# Patient Record
Sex: FEMALE | Race: BLACK OR AFRICAN AMERICAN | NOT HISPANIC OR LATINO | Employment: OTHER | URBAN - NONMETROPOLITAN AREA
[De-identification: names, ages, dates, MRNs, and addresses within clinical notes are randomized per-mention and may not be internally consistent; named-entity substitution may affect disease eponyms.]

---

## 2018-03-27 ENCOUNTER — HISTORICAL (OUTPATIENT)
Dept: ADMINISTRATIVE | Facility: HOSPITAL | Age: 83
End: 2018-03-27

## 2018-06-18 ENCOUNTER — HISTORICAL (OUTPATIENT)
Dept: ADMINISTRATIVE | Facility: HOSPITAL | Age: 83
End: 2018-06-18

## 2018-06-25 ENCOUNTER — HISTORICAL (OUTPATIENT)
Dept: ADMINISTRATIVE | Facility: HOSPITAL | Age: 83
End: 2018-06-25

## 2018-09-17 ENCOUNTER — HISTORICAL (OUTPATIENT)
Dept: ADMINISTRATIVE | Facility: HOSPITAL | Age: 83
End: 2018-09-17

## 2018-11-06 ENCOUNTER — HISTORICAL (OUTPATIENT)
Dept: ADMINISTRATIVE | Facility: HOSPITAL | Age: 83
End: 2018-11-06

## 2018-12-17 ENCOUNTER — HISTORICAL (OUTPATIENT)
Dept: ADMINISTRATIVE | Facility: HOSPITAL | Age: 83
End: 2018-12-17

## 2018-12-18 ENCOUNTER — HISTORICAL (OUTPATIENT)
Dept: ADMINISTRATIVE | Facility: HOSPITAL | Age: 83
End: 2018-12-18

## 2018-12-26 ENCOUNTER — HISTORICAL (OUTPATIENT)
Dept: ADMINISTRATIVE | Facility: HOSPITAL | Age: 83
End: 2018-12-26

## 2018-12-28 ENCOUNTER — HISTORICAL (OUTPATIENT)
Dept: ADMINISTRATIVE | Facility: HOSPITAL | Age: 83
End: 2018-12-28

## 2019-01-19 ENCOUNTER — HISTORICAL (OUTPATIENT)
Dept: ADMINISTRATIVE | Facility: HOSPITAL | Age: 84
End: 2019-01-19

## 2019-06-28 ENCOUNTER — HISTORICAL (OUTPATIENT)
Dept: ADMINISTRATIVE | Facility: HOSPITAL | Age: 84
End: 2019-06-28

## 2019-11-18 ENCOUNTER — HISTORICAL (OUTPATIENT)
Dept: ADMINISTRATIVE | Facility: HOSPITAL | Age: 84
End: 2019-11-18

## 2019-12-04 ENCOUNTER — HISTORICAL (OUTPATIENT)
Dept: ADMINISTRATIVE | Facility: HOSPITAL | Age: 84
End: 2019-12-04

## 2020-04-28 ENCOUNTER — HISTORICAL (OUTPATIENT)
Dept: ADMINISTRATIVE | Facility: HOSPITAL | Age: 85
End: 2020-04-28

## 2022-01-19 ENCOUNTER — HISTORICAL (OUTPATIENT)
Dept: ADMINISTRATIVE | Facility: HOSPITAL | Age: 87
End: 2022-01-19

## 2022-02-24 LAB
AGE: 89
ALBUMIN SERPL-MCNC: 4.4 G/DL (ref 3.4–5)
ALBUMIN/GLOB SERPL: 1.6 {RATIO}
ALP SERPL-CCNC: 140 U/L (ref 50–144)
ALT SERPL-CCNC: 17 U/L (ref 1–45)
ANION GAP SERPL CALC-SCNC: 8 MMOL/L (ref 2–13)
AST SERPL-CCNC: 31 U/L (ref 14–36)
BASOPHILS # BLD AUTO: 0.04 10*3/UL (ref 0.01–0.08)
BASOPHILS NFR BLD AUTO: 0.6 % (ref 0.1–1.2)
BILIRUB SERPL-MCNC: 0.69 MG/DL (ref 0–1)
BUN SERPL-MCNC: 13 MG/DL (ref 7–20)
CALCIUM SERPL-MCNC: 9.9 MG/DL (ref 8.4–10.2)
CHLORIDE SERPL-SCNC: 101 MMOL/L (ref 94–110)
CHOLEST SERPL-MCNC: 171 MG/DL (ref 0–200)
CO2 SERPL-SCNC: 27 MMOL/L (ref 21–32)
CREAT SERPL-MCNC: 0.52 MG/DL (ref 0.52–1.04)
CREAT/UREA NIT SERPL: 25 (ref 12–20)
EOSINOPHIL # BLD AUTO: 0.04 10*3/UL (ref 0.04–0.36)
EOSINOPHIL NFR BLD AUTO: 0.6 % (ref 0.7–7)
ERYTHROCYTE [DISTWIDTH] IN BLOOD BY AUTOMATED COUNT: 13.5 % (ref 11–14.5)
EST. AVERAGE GLUCOSE BLD GHB EST-MCNC: 168 MG/DL (ref 70–115)
GLOBULIN SER-MCNC: 2.7 G/DL (ref 2–3.9)
GLUCOSE SERPL-MCNC: 114 MG/DL (ref 70–115)
HBA1C MFR BLD: 7.6 % (ref 4–6)
HCT VFR BLD AUTO: 40.9 % (ref 36–48)
HDLC SERPL-MCNC: 73 MG/DL (ref 40–60)
HGB BLD-MCNC: 14.3 G/DL (ref 11.8–16)
IMM GRANULOCYTES # BLD AUTO: 0.01 10*3/UL (ref 0–0.03)
IMM GRANULOCYTES NFR BLD AUTO: 0.2 % (ref 0–0.5)
LDLC SERPL CALC-MCNC: 69.1 MG/DL (ref 30–100)
LYMPHOCYTES # BLD AUTO: 1.48 10*3/UL (ref 1.16–3.74)
LYMPHOCYTES NFR BLD AUTO: 23.6 % (ref 20–55)
MANUAL DIFF? (OHS): NORMAL
MCH RBC QN AUTO: 31.4 PG (ref 27–34)
MCHC RBC AUTO-ENTMCNC: 35 G/DL (ref 31–37)
MCV RBC AUTO: 89.7 FL (ref 79–99)
MONOCYTES # BLD AUTO: 0.67 10*3/UL (ref 0.24–0.36)
MONOCYTES NFR BLD AUTO: 10.7 % (ref 4.7–12.5)
NEUTROPHILS # BLD AUTO: 4.03 10*3/UL (ref 1.56–6.13)
NEUTROPHILS NFR BLD AUTO: 64.3 % (ref 37–73)
PLATELET # BLD AUTO: 247 10*3/UL (ref 140–371)
PMV BLD AUTO: 9.1 FL (ref 9.4–12.4)
POTASSIUM SERPL-SCNC: 4.3 MMOL/L (ref 3.5–5.1)
PROT SERPL-MCNC: 7.1 G/DL (ref 6.3–8.2)
RBC # BLD AUTO: 4.56 10*6/UL (ref 4–5.1)
SODIUM SERPL-SCNC: 136 MMOL/L (ref 135–145)
TRIGL SERPL-MCNC: 86 MG/DL (ref 30–200)
TSH SERPL-ACNC: 0.84 M[IU]/L (ref 0.36–3.74)
WBC # SPEC AUTO: 6.3 10*3/UL (ref 4–11.5)

## 2022-04-10 ENCOUNTER — HISTORICAL (OUTPATIENT)
Dept: ADMINISTRATIVE | Facility: HOSPITAL | Age: 87
End: 2022-04-10

## 2022-04-28 VITALS
SYSTOLIC BLOOD PRESSURE: 151 MMHG | HEIGHT: 62 IN | DIASTOLIC BLOOD PRESSURE: 67 MMHG | BODY MASS INDEX: 29.63 KG/M2 | WEIGHT: 161 LBS

## 2022-05-15 ENCOUNTER — HISTORICAL (OUTPATIENT)
Dept: ADMINISTRATIVE | Facility: HOSPITAL | Age: 87
End: 2022-05-15

## 2022-05-21 NOTE — HISTORICAL [facility] CERNER
This is a historical note converted from Domo. Formatting and pictures may have been removed.  Please reference Domo for original formatting and attached multimedia. Chief Complaint  Est Care - DM  History of Present Illness  88 y/o BF previously seen by Dr Pickett. ?She has a past medical history of diabetes,?bowel obstruction,?colostomy,?chronic pain?secondary to traumatic injury?of the?hip.  ?  She states she is doing well in general.?She lives alone and?tends to her basic ADLs independently. She is debilitated secondary to trauma, fracture Rt hip and femur several years ago. Ambulates with walker short distances. She denies falls. She has a son with?schizophrenia, lives in [place] and checks on her 2-3 times weekly; he shops for her groceries when he comes to visit. She has a brother nearby that drives her to appointment.  ?  Checks her CBG every morning; states that it is good. She reports?that glucose ranges from 125-200. ?She takes 18 units of Tresiba?every evening?and reports that almost every evening she wakes up?feeling?weak, shaky, and slightly disoriented; she does not check glucose levels during these times.? She keeps crackers and candies by her bedside,?eats that?and recovers within a short period of time and is able to go back to sleep. ?He does not remember?when her last blood work was,?has no idea what her A1c?has been.  ?  She has a colostomy, since 2009, when she had a ruptured bowel. ?She does not have any difficulty with maintaining?the colostomy.  Review of Systems  Constitutional:?no fever, fatigue  Eye:?no vision changes, eye redness, drainage, or pain  ENMT:?no sore throat, ear pain, sinus pain/congestion, nasal congestion/drainage  Respiratory:?no cough, no wheezing, no shortness of breath  Cardiovascular:?no chest pain, no palpitations, no edema  Gastrointestinal:?no nausea, vomiting, diarrhea, or?abdominal pain  Genitourinary:?no dysuria, no frequency or urgency, no hematuria;  +urinary incontinence  Endocrine:?no heat or cold intolerance, no excessive thirst or excessive urination  Musculoskeletal:?no muscle or joint pain, no joint swelling  Integumentary:?no skin rash or abnormal lesion  Neurologic: no headache, no dizziness  ?  Physical Exam  Vitals & Measurements  T:?36.8? ?C (Oral)? HR:?70(Peripheral)? BP:?136/80? SpO2:?96%?  HT:?152.00?cm? WT:?72.000?kg? BMI:?31.16?  General:?AAOx3, in no acute distress  Eye: clear conjunctiva, eyelids normal  HENT:?TMs/ear canals clear, oropharynx without erythema/exudate  Neck: no thyromegaly or lymphadenopathy; no bruits, no JVD  Respiratory:?clear to auscultation bilaterally  Cardiovascular:?regular rate and rhythm with grade 1/6 Systolic?murmurs RSB  Gastrointestinal:?soft, non-tender, with normal bowel sounds?  Genitourinary: no CVA tenderness  Integumentary: no rashes present,?+1 peripheral edema bilaterally  Musculoskeletal: no vertebral tenderness; steady gait  Neurologic: Cranial nerves grossly intact as tested, no sign of peripheral neurological deficit, sensory function intact; decreased strength RLE; NVI  ?  ?  Assessment/Plan  1.?Encounter to establish care ? Z76.89  Labs today (she is fasting); will call with results and make follow up then  2.?Diabetes mellitus ? E11.9  Decreased Tresiba?to 10 units nightly,?patient understands, instructions written for patient and read back by her.?  Continue Ozempic 1 mg?subcut weekly  3.?Colostomy status ? Z93.3  Supplies?ordered today  4.?Medication management ? Z79.899  Explore delivery options and send rx  5.?Hx of deep vein thrombophlebitis of lower extremity ? Z86.72  Remote hx; not on anticoagulants  Patient verbalizes understanding of plan and agrees.? Call w any additional questions, concerns, or issues.? RTC prn or as?scheduled.?   Problem List/Past Medical History  Ongoing  Blood clotting disorder  Closed comminuted intertrochanteric fracture of right femur  Colostomy status  Diabetes  mellitus  Hx of deep vein thrombophlebitis of lower extremity  Medication management  Historical  No qualifying data  Procedure/Surgical History  Transfusion of Nonautologous Red Blood Cells into Peripheral Vein, Percutaneous Approach (08/05/2018)  Intramedullary Aron Insertion Femur (Right) (08/03/2018)  Reposition Right Upper Femur with Intramedullary Internal Fixation Device, Percutaneous Approach (08/03/2018)  bladder suspension  Cholecystectomy;  Colostomy or skin level cecostomy;  Total abdominal hysterectomy (corpus and cervix), with or without removal of tube(s), with or without removal of ovary(s);   Medications  LACTULOSE SOL 10GM/15,? ?Not taking  Ozempic 2 mg/1.5 mL (1 mg dose) subcutaneous solution, 1 mg, Subcutaneous, qWeek  TF 2 3/4 Flng w/ col, See Instructions, 3 refills  Trans 2 3/4 Flng, See Instructions, 3 refills  Tresiba FlexTouch 100 units/mL subcutaneous solution, 10 units, Subcutaneous, Daily  Allergies  Lyrica?(unknown)  Social History  Abuse/Neglect  No, 02/24/2022  Tobacco  Never (less than 100 in lifetime), N/A, 02/24/2022  Family History  Family history is unknown  Immunizations  Vaccine Date Status   COVID-19 mRNA, LNP-S, PF - Moderna 12/08/2021 Recorded   COVID-19 mRNA, LNP-S, PF - Moderna 03/18/2021 Recorded   COVID-19 mRNA, LNP-S, PF - Moderna 02/18/2021 Recorded   influenza virus vaccine, inactivated 11/06/2012 Recorded   Health Maintenance  Health Maintenance  ???Pending?(in the next year)  ??? ??OverDue  ??? ? ? ?Influenza Vaccine due??10/01/21??and every 1??day(s)  ??? ? ? ?Advance Directive due??01/02/22??and every 1??year(s)  ??? ? ? ?Cognitive Screening due??01/02/22??and every 1??year(s)  ??? ? ? ?Functional Assessment due??01/02/22??and every 1??year(s)  ??? ??Due?  ??? ? ? ?ADL Screening due??02/25/22??and every 1??year(s)  ??? ? ? ?Bone Density Screening due??02/25/22??Variable frequency  ??? ? ? ?Diabetes Maintenance-Eye Exam due??02/25/22??Unknown Frequency  ??? ? ?  ?Diabetes Maintenance-Foot Exam due??02/25/22??Unknown Frequency  ??? ? ? ?Diabetes Maintenance-Microalbumin due??02/25/22??Unknown Frequency  ??? ? ? ?Pneumococcal Vaccine due??02/25/22??Unknown Frequency  ??? ? ? ?Tetanus Vaccine due??02/25/22??and every 10??year(s)  ??? ? ? ?Zoster Vaccine due??02/25/22??Unknown Frequency  ??? ??Due In Future?  ??? ? ? ?Medicare Annual Wellness Exam not due until??05/24/22??and every 1??year(s)  ??? ? ? ?Obesity Screening not due until??01/01/23??and every 1??year(s)  ??? ? ? ?Fall Risk Assessment not due until??01/02/23??and every 1??year(s)  ??? ? ? ?Blood Pressure Screening not due until??02/24/23??and every 1??year(s)  ??? ? ? ?Body Mass Index Check not due until??02/24/23??and every 1??year(s)  ??? ? ? ?Depression Screening not due until??02/24/23??and every 1??year(s)  ??? ? ? ?Diabetes Maintenance-HgbA1c not due until??02/24/23??and every 1??year(s)  ??? ? ? ?Diabetes Maintenance-Fasting Lipid Profile not due until??02/24/23??and every 1??year(s)  ??? ? ? ?Diabetes Maintenance-Serum Creatinine not due until??02/24/23??and every 1??year(s)  ???Satisfied?(in the past 1 year)  ??? ??Satisfied?  ??? ? ? ?Blood Pressure Screening on??02/24/22.??Satisfied by Sandra Sadler  ??? ? ? ?Body Mass Index Check on??02/24/22.??Satisfied by Sandra Sadler  ??? ? ? ?Depression Screening on??02/24/22.??Satisfied by Sandra Sadler  ??? ? ? ?Diabetes Maintenance-Fasting Lipid Profile on??02/24/22.??Satisfied by Contributor_system, VICENTES_EVIDENT  ??? ? ? ?Diabetes Maintenance-HgbA1c on??02/24/22.??Satisfied by Contributor_system, VICENTES_EVIDENT  ??? ? ? ?Diabetes Maintenance-Serum Creatinine on??02/24/22.??Satisfied by Contributor_system, JENBARRYS_EVIDENT  ??? ? ? ?Diabetes Screening on??02/24/22.??Satisfied by Contributor_system, JENBARRYS_EVIDENT  ??? ? ? ?Fall Risk Assessment on??02/24/22.??Satisfied by Sandra Sadler  ??? ? ? ?Hypertension Management-BMP  on??02/24/22.??Satisfied by Contributor_system, VICENTES_EVIDENT  ??? ? ? ?Hypertension Management-Blood Pressure on??02/24/22.??Satisfied by Sandra Sadler  ??? ? ? ?Lipid Screening on??02/24/22.??Satisfied by Contributor_system, OLGA_EVIDENT  ??? ? ? ?Obesity Screening on??02/24/22.??Satisfied by Sandra Sadler  ?

## 2022-09-06 ENCOUNTER — HISTORICAL (OUTPATIENT)
Dept: ADMINISTRATIVE | Facility: HOSPITAL | Age: 87
End: 2022-09-06

## 2023-02-08 ENCOUNTER — TELEPHONE (OUTPATIENT)
Dept: FAMILY MEDICINE | Facility: CLINIC | Age: 88
End: 2023-02-08

## 2023-02-08 DIAGNOSIS — E11.9 TYPE 2 DIABETES MELLITUS WITHOUT COMPLICATION, WITHOUT LONG-TERM CURRENT USE OF INSULIN: Primary | ICD-10-CM

## 2023-02-08 RX ORDER — SEMAGLUTIDE 1.34 MG/ML
1 INJECTION, SOLUTION SUBCUTANEOUS
COMMUNITY
Start: 2023-01-13 | End: 2023-02-08 | Stop reason: SDUPTHER

## 2023-02-08 RX ORDER — SEMAGLUTIDE 1.34 MG/ML
1 INJECTION, SOLUTION SUBCUTANEOUS
Qty: 2 PEN | Refills: 6 | Status: SHIPPED | OUTPATIENT
Start: 2023-02-08 | Stop reason: CLARIF

## 2023-02-08 NOTE — TELEPHONE ENCOUNTER
----- Message from Beatriz Lam sent at 2/8/2023 11:11 AM CST -----  Regarding: refill  Ozempic        Pt will take her last shot on tomorrow, so she will be out.            [facility]       [phone] home number    [phone] mobile

## 2023-02-13 RX ORDER — SEMAGLUTIDE 1.34 MG/ML
2 INJECTION, SOLUTION SUBCUTANEOUS
COMMUNITY
Start: 2023-01-19 | Stop reason: CLARIF

## 2023-05-10 ENCOUNTER — TELEPHONE (OUTPATIENT)
Dept: FAMILY MEDICINE | Facility: CLINIC | Age: 88
End: 2023-05-10
Payer: MEDICARE

## 2023-05-10 DIAGNOSIS — E11.9 TYPE 2 DIABETES MELLITUS WITHOUT COMPLICATION, WITHOUT LONG-TERM CURRENT USE OF INSULIN: Primary | ICD-10-CM

## 2023-05-10 NOTE — TELEPHONE ENCOUNTER
[facility] called and said that her copay on Ozempic 1mg is over $100 so they wanted to know if it can be changed to Victoza. I spoke with Kaycee and she said that it can be changed to Victoza 0.6mg daily x7 days then increase to 1.2mg daily there after

## 2023-06-22 VITALS
HEIGHT: 60 IN | DIASTOLIC BLOOD PRESSURE: 82 MMHG | WEIGHT: 143 LBS | SYSTOLIC BLOOD PRESSURE: 154 MMHG | OXYGEN SATURATION: 98 % | HEART RATE: 72 BPM | BODY MASS INDEX: 28.07 KG/M2

## 2023-07-03 ENCOUNTER — TELEPHONE (OUTPATIENT)
Dept: FAMILY MEDICINE | Facility: CLINIC | Age: 88
End: 2023-07-03
Payer: MEDICARE

## 2023-07-03 DIAGNOSIS — E11.9 TYPE 2 DIABETES MELLITUS WITHOUT COMPLICATION, WITHOUT LONG-TERM CURRENT USE OF INSULIN: ICD-10-CM

## 2023-08-08 ENCOUNTER — TELEPHONE (OUTPATIENT)
Dept: FAMILY MEDICINE | Facility: CLINIC | Age: 88
End: 2023-08-08
Payer: MEDICARE

## 2023-08-08 DIAGNOSIS — Z93.3 COLOSTOMY STATUS: Primary | ICD-10-CM

## 2023-08-08 NOTE — TELEPHONE ENCOUNTER
----- Message from Beatriz Lam sent at 8/7/2023  9:51 AM CDT -----  Regarding: call back  Pt is needing a refill on her colostomy bag supplies, needs it to [facility] in [place].    [phone]

## 2023-08-08 NOTE — TELEPHONE ENCOUNTER
----- Message from Beatriz Lam sent at 8/7/2023  9:51 AM CDT -----  Regarding: call back  Pt is needing a refill on her colostomy bag supplies, needs it to [facility] in [place].    [phone]

## 2023-08-10 ENCOUNTER — TELEPHONE (OUTPATIENT)
Dept: FAMILY MEDICINE | Facility: CLINIC | Age: 88
End: 2023-08-10
Payer: MEDICARE

## 2023-08-10 NOTE — TELEPHONE ENCOUNTER
I called [facility] and spoke with Theodora. She said that they received the order but didn't really need one because the orders don't  and that Lanie received her supplies on . I called Karen and explained that to her. She verbalized understanding.

## 2023-08-10 NOTE — TELEPHONE ENCOUNTER
----- Message from Mak Vyas sent at 8/10/2023 10:07 AM CDT -----  Regarding: refill  Pt is needing updated script for colotomy supply's     [facility] in [place]    [phone] Karen Johnson-Daughter

## 2023-08-16 ENCOUNTER — TELEPHONE (OUTPATIENT)
Dept: FAMILY MEDICINE | Facility: CLINIC | Age: 88
End: 2023-08-16
Payer: MEDICARE

## 2023-08-16 DIAGNOSIS — E11.9 TYPE 2 DIABETES MELLITUS WITHOUT COMPLICATION, WITHOUT LONG-TERM CURRENT USE OF INSULIN: ICD-10-CM

## 2023-09-11 ENCOUNTER — TELEPHONE (OUTPATIENT)
Dept: FAMILY MEDICINE | Facility: CLINIC | Age: 88
End: 2023-09-11
Payer: MEDICARE

## 2023-09-11 DIAGNOSIS — Z12.31 ENCOUNTER FOR SCREENING MAMMOGRAM FOR MALIGNANT NEOPLASM OF BREAST: ICD-10-CM

## 2023-09-11 DIAGNOSIS — Z12.39 ENCOUNTER FOR SCREENING FOR MALIGNANT NEOPLASM OF BREAST, UNSPECIFIED SCREENING MODALITY: Primary | ICD-10-CM

## 2023-09-11 PROCEDURE — 80048 BASIC METABOLIC PNL TOTAL CA: CPT | Performed by: NURSE PRACTITIONER

## 2023-09-11 PROCEDURE — 83036 HEMOGLOBIN GLYCOSYLATED A1C: CPT | Performed by: NURSE PRACTITIONER

## 2023-09-19 ENCOUNTER — HOSPITAL ENCOUNTER (OUTPATIENT)
Dept: RADIOLOGY | Facility: HOSPITAL | Age: 88
Discharge: HOME | End: 2023-09-19
Attending: NURSE PRACTITIONER
Payer: MEDICARE

## 2023-09-19 ENCOUNTER — OFFICE VISIT (OUTPATIENT)
Dept: FAMILY MEDICINE | Facility: CLINIC | Age: 88
End: 2023-09-19
Payer: MEDICARE

## 2023-09-19 VITALS
SYSTOLIC BLOOD PRESSURE: 118 MMHG | HEIGHT: 60 IN | BODY MASS INDEX: 30.23 KG/M2 | HEART RATE: 82 BPM | OXYGEN SATURATION: 99 % | WEIGHT: 154 LBS | DIASTOLIC BLOOD PRESSURE: 58 MMHG | TEMPERATURE: 97 F

## 2023-09-19 DIAGNOSIS — H60.312 ACUTE DIFFUSE OTITIS EXTERNA OF LEFT EAR: ICD-10-CM

## 2023-09-19 DIAGNOSIS — Z79.4 TYPE 2 DIABETES MELLITUS WITHOUT COMPLICATION, WITH LONG-TERM CURRENT USE OF INSULIN: Primary | ICD-10-CM

## 2023-09-19 DIAGNOSIS — Z90.11 HISTORY OF MASTECTOMY, RIGHT: ICD-10-CM

## 2023-09-19 DIAGNOSIS — H61.22 IMPACTED CERUMEN OF LEFT EAR: ICD-10-CM

## 2023-09-19 DIAGNOSIS — E11.9 TYPE 2 DIABETES MELLITUS WITHOUT COMPLICATION, WITH LONG-TERM CURRENT USE OF INSULIN: Primary | ICD-10-CM

## 2023-09-19 DIAGNOSIS — Z12.39 ENCOUNTER FOR SCREENING FOR MALIGNANT NEOPLASM OF BREAST, UNSPECIFIED SCREENING MODALITY: ICD-10-CM

## 2023-09-19 DIAGNOSIS — Z12.31 ENCOUNTER FOR SCREENING MAMMOGRAM FOR MALIGNANT NEOPLASM OF BREAST: ICD-10-CM

## 2023-09-19 DIAGNOSIS — Z85.3 HISTORY OF BREAST CANCER: ICD-10-CM

## 2023-09-19 PROBLEM — Z86.718 HISTORY OF DVT (DEEP VEIN THROMBOSIS): Status: ACTIVE | Noted: 2023-09-19

## 2023-09-19 PROBLEM — D68.9 COAGULATION DISORDER: Status: ACTIVE | Noted: 2023-09-19

## 2023-09-19 PROCEDURE — 99214 PR OFFICE/OUTPT VISIT, EST, LEVL IV, 30-39 MIN: ICD-10-PCS | Mod: ,,, | Performed by: NURSE PRACTITIONER

## 2023-09-19 PROCEDURE — 99214 OFFICE O/P EST MOD 30 MIN: CPT | Mod: ,,, | Performed by: NURSE PRACTITIONER

## 2023-09-19 PROCEDURE — 77067 SCR MAMMO BI INCL CAD: CPT | Mod: TC,52

## 2023-09-19 RX ORDER — INSULIN DEGLUDEC 100 U/ML
INJECTION, SOLUTION SUBCUTANEOUS
COMMUNITY
Start: 2023-07-03 | End: 2024-01-23 | Stop reason: ALTCHOICE

## 2023-09-19 RX ORDER — CIPROFLOXACIN AND DEXAMETHASONE 3; 1 MG/ML; MG/ML
4 SUSPENSION/ DROPS AURICULAR (OTIC) 2 TIMES DAILY
Qty: 7.5 ML | Refills: 0 | Status: SHIPPED | OUTPATIENT
Start: 2023-09-19 | End: 2024-01-23 | Stop reason: ALTCHOICE

## 2023-09-19 NOTE — PROGRESS NOTES
"Patient ID: Lanie Abad  : 10/26/1932    Chief Complaint: Lab results (6mth f/u Labs)    Allergies: Patient is allergic to pregabalin.     History of Present Illness:  The patient is a 89 y.o. Black or  female who presents to clinic for follow up on Lab results (6mth f/u Labs)     She is here to follow-up on her diabetes.  She is very good about checking her glucose every morning and also checks it in the evenings.  Has been on 14 units of Tresiba for quite a long period of time.  In the past she is reported some occasional low readings that occurred during the night, she tells me when it gets to 100 she become symptomatic and knows to get up and eat a snack.  Did attempt to add an SGLT2 to her regimen however, she has not been very receptive to changing diabetic medications in the past. She was on Ozempic at one point in time but is now on Victoza; she is please with this medication.  Last A1c was 7.5% and now 8.8%; she tells me "that medicine is not holdin' me no more."   She is very good mentation, seems to have very good knowledge of her medications and how to take them.  A lives alone administers her own medications but does have some that helps her out during the daytime with transportation and basic ADLs.    She continues to follow up with Oncology @ [facility]. Has a mammogram today.     Social History:  reports that she has never smoked. She has never used smokeless tobacco. She reports that she does not drink alcohol and does not use drugs.    Past Medical History:  has a past medical history of Blood clotting disorder, Breast cancer, Closed comminuted intertrochanteric fracture of femur, Colostomy status, Diabetes mellitus, type 2, and History of deep vein thrombophlebitis of lower extremity.    Current Medications:  Current Outpatient Medications   Medication Instructions    ciprofloxacin-dexAMETHasone 0.3-0.1% (CIPRODEX) 0.3-0.1 % DrpS 4 drops, Both Ears, 2 times daily    liraglutide 0.6 " "mg/0.1 mL (18 mg/3 mL) subq PNIJ (VICTOZA 2-RADHA) 1.8 mg, Subcutaneous, Daily    TRESIBA FLEXTOUCH U-100 100 unit/mL (3 mL) insulin pen SMARTSI Unit(s) SUB-Q Every Evening       Review of Systems   See HPI    Visit Vitals  BP (!) 118/58 (BP Location: Left arm)   Pulse 82   Temp 97.2 °F (36.2 °C) (Temporal)   Ht 4' 11.84" (1.52 m)   Wt 69.9 kg (154 lb)   SpO2 99%   BMI 30.24 kg/m²       Physical Exam  Vitals reviewed.   Constitutional:       Appearance: Normal appearance.   Cardiovascular:      Heart sounds: Normal heart sounds.   Pulmonary:      Breath sounds: Normal breath sounds.   Skin:     General: Skin is warm and dry.      Comments: Right mastectomy; incisions healed.    Neurological:      Mental Status: She is oriented to person, place, and time.              Labs Reviewed:  Chemistry:  Lab Results   Component Value Date     (L) 2023    K 4.8 2023    CHLORIDE 97 (L) 2023    BUN 12.0 2023    CREATININE 0.53 (L) 2023    EGFRNORACEVR 88 2023    GLUCOSE 254 (H) 2023    CALCIUM 9.6 2023    ALKPHOS 140 2022    LABPROT 7.1 2022    ALBUMIN 4.4 2022    BILIDIR 0.10 2018    IBILI 0.30 2018    AST 31 2022    ALT 17 2022    MG 1.9 2018    PHOS 1.3 (L) 2018    TSH 0.84 2022        Lab Results   Component Value Date    HGBA1C 8.8 (H) 2023          Assessment & Plan:  1. Type 2 diabetes mellitus without complication, with long-term current use of insulin  Overview:  On Tresiba 14 units daily and Victoza 1.2 mg Qd.    Assessment & Plan:  Increase Victoza to 1.8 mg daily.  Continue Tresiba at 14 units; cannot titrate up as she has occasional nighttime lows.   Refusing additional medications at this time.   F/u 1 month with log.     Orders:  -     liraglutide 0.6 mg/0.1 mL, 18 mg/3 mL, subq PNIJ (VICTOZA 2-RADHA) 0.6 mg/0.1 mL (18 mg/3 mL) PnIj pen; Inject 1.8 mg into the skin once daily.  Dispense: 9 mL; " Refill: 11    2. History of breast cancer  Overview:  2022; status post right mastectomy.      3. History of mastectomy, right  Overview:  2022 following breast cancer dx      4. Impacted cerumen of left ear  -     Ear wax removal    5. Acute diffuse otitis externa of left ear  -     ciprofloxacin-dexAMETHasone 0.3-0.1% (CIPRODEX) 0.3-0.1 % DrpS; Place 4 drops into both ears 2 (two) times daily.  Dispense: 7.5 mL; Refill: 0         Future Appointments   Date Time Provider Department Center   10/17/2023  1:30 PM Kaycee Esparza, [facility]       Follow up in about 4 weeks (around 10/17/2023) for diabetes f/u. Call sooner if needed.    DAVID Staples    Lab Frequency Next Occurrence   Mammo Digital Screening Bilat w/ [facility] Once 09/12/2023

## 2023-09-19 NOTE — ASSESSMENT & PLAN NOTE
Increase Victoza to 1.8 mg daily.  Continue Tresiba at 14 units; cannot titrate up as she has occasional nighttime lows.   Refusing additional medications at this time.   F/u 1 month with log.

## 2023-10-01 ENCOUNTER — HOSPITAL ENCOUNTER (INPATIENT)
Facility: HOSPITAL | Age: 88
LOS: 8 days | Discharge: NURSING, REHAB OR LONG-TERM CARE | DRG: 689 | End: 2023-10-09
Attending: FAMILY MEDICINE | Admitting: INTERNAL MEDICINE
Payer: MEDICARE

## 2023-10-01 DIAGNOSIS — R73.9 HYPERGLYCEMIA: Primary | ICD-10-CM

## 2023-10-01 DIAGNOSIS — R42 DIZZINESS: ICD-10-CM

## 2023-10-01 LAB
ALBUMIN SERPL-MCNC: 3.8 G/DL (ref 3.4–5)
ALBUMIN/GLOB SERPL: 1.4 RATIO
ALP SERPL-CCNC: 211 UNIT/L (ref 50–144)
ALT SERPL-CCNC: 20 UNIT/L (ref 1–45)
ANION GAP SERPL CALC-SCNC: 10 MEQ/L (ref 2–13)
APPEARANCE UR: ABNORMAL
AST SERPL-CCNC: 26 UNIT/L (ref 14–36)
BACTERIA #/AREA URNS AUTO: ABNORMAL /HPF
BASOPHILS # BLD AUTO: 0.04 X10(3)/MCL (ref 0.01–0.08)
BASOPHILS NFR BLD AUTO: 0.3 % (ref 0.1–1.2)
BILIRUB SERPL-MCNC: 1.4 MG/DL (ref 0–1)
BILIRUB UR QL STRIP.AUTO: NEGATIVE
BNP BLD-MCNC: 225 PG/ML (ref 0–124.9)
BUN SERPL-MCNC: 14 MG/DL (ref 7–20)
CALCIUM SERPL-MCNC: 9.1 MG/DL (ref 8.4–10.2)
CHLORIDE SERPL-SCNC: 94 MMOL/L (ref 98–110)
CK MB SERPL-MCNC: 2.21 NG/ML (ref 0–3.38)
CK SERPL-CCNC: 110 U/L (ref 30–135)
CO2 SERPL-SCNC: 22 MMOL/L (ref 21–32)
COLOR UR AUTO: YELLOW
CREAT SERPL-MCNC: 0.61 MG/DL (ref 0.66–1.25)
CREAT/UREA NIT SERPL: 23 (ref 12–20)
EOSINOPHIL # BLD AUTO: 0 X10(3)/MCL (ref 0.04–0.36)
EOSINOPHIL NFR BLD AUTO: 0 % (ref 0.7–7)
ERYTHROCYTE [DISTWIDTH] IN BLOOD BY AUTOMATED COUNT: 13.2 % (ref 11–14.5)
GFR SERPLBLD CREATININE-BSD FMLA CKD-EPI: 85 MLS/MIN/1.73/M2
GLOBULIN SER-MCNC: 2.7 GM/DL (ref 2–3.9)
GLUCOSE SERPL-MCNC: 378 MG/DL (ref 70–115)
GLUCOSE UR QL STRIP.AUTO: NEGATIVE
HCT VFR BLD AUTO: 40.7 % (ref 36–48)
HGB BLD-MCNC: 14.2 G/DL (ref 11.8–16)
IMM GRANULOCYTES # BLD AUTO: 0.06 X10(3)/MCL (ref 0–0.03)
IMM GRANULOCYTES NFR BLD AUTO: 0.4 % (ref 0–0.5)
KETONES UR QL STRIP.AUTO: NEGATIVE
LEUKOCYTE ESTERASE UR QL STRIP.AUTO: ABNORMAL
LYMPHOCYTES # BLD AUTO: 0.37 X10(3)/MCL (ref 1.16–3.74)
LYMPHOCYTES NFR BLD AUTO: 2.7 % (ref 20–55)
MCH RBC QN AUTO: 31.3 PG (ref 27–34)
MCHC RBC AUTO-ENTMCNC: 34.9 G/DL (ref 31–37)
MCV RBC AUTO: 89.8 FL (ref 79–99)
MONOCYTES # BLD AUTO: 1.14 X10(3)/MCL (ref 0.24–0.36)
MONOCYTES NFR BLD AUTO: 8.2 % (ref 4.7–12.5)
NEUTROPHILS # BLD AUTO: 12.23 X10(3)/MCL (ref 1.56–6.13)
NEUTROPHILS NFR BLD AUTO: 88.4 % (ref 37–73)
NITRITE UR QL STRIP.AUTO: POSITIVE
NRBC BLD AUTO-RTO: 0 %
PH UR STRIP.AUTO: 6 [PH]
PLATELET # BLD AUTO: 211 X10(3)/MCL (ref 140–371)
PMV BLD AUTO: 9.2 FL (ref 9.4–12.4)
POCT GLUCOSE: 356 MG/DL (ref 70–110)
POCT GLUCOSE: 386 MG/DL (ref 70–110)
POCT GLUCOSE: >500 MG/DL (ref 70–110)
POTASSIUM SERPL-SCNC: 4.5 MMOL/L (ref 3.5–5.1)
PROT SERPL-MCNC: 6.5 GM/DL (ref 6.3–8.2)
PROT UR QL STRIP.AUTO: ABNORMAL
RBC # BLD AUTO: 4.53 X10(6)/MCL (ref 4–5.1)
RBC #/AREA URNS AUTO: ABNORMAL /HPF
RBC UR QL AUTO: ABNORMAL
SODIUM SERPL-SCNC: 126 MMOL/L (ref 135–145)
SP GR UR STRIP.AUTO: 1.01 (ref 1–1.03)
SQUAMOUS #/AREA URNS AUTO: ABNORMAL /HPF
TROPONIN I SERPL-MCNC: <0.012 NG/ML (ref 0–0.03)
UROBILINOGEN UR STRIP-ACNC: 1
WBC # SPEC AUTO: 13.84 X10(3)/MCL (ref 4–11.5)
WBC #/AREA URNS AUTO: ABNORMAL /HPF

## 2023-10-01 PROCEDURE — 93005 ELECTROCARDIOGRAM TRACING: CPT

## 2023-10-01 PROCEDURE — 81001 URINALYSIS AUTO W/SCOPE: CPT | Performed by: FAMILY MEDICINE

## 2023-10-01 PROCEDURE — 82962 GLUCOSE BLOOD TEST: CPT

## 2023-10-01 PROCEDURE — 83880 ASSAY OF NATRIURETIC PEPTIDE: CPT | Performed by: FAMILY MEDICINE

## 2023-10-01 PROCEDURE — 94761 N-INVAS EAR/PLS OXIMETRY MLT: CPT

## 2023-10-01 PROCEDURE — 25000003 PHARM REV CODE 250: Performed by: FAMILY MEDICINE

## 2023-10-01 PROCEDURE — 99285 EMERGENCY DEPT VISIT HI MDM: CPT | Mod: 25

## 2023-10-01 PROCEDURE — 85025 COMPLETE CBC W/AUTO DIFF WBC: CPT | Performed by: FAMILY MEDICINE

## 2023-10-01 PROCEDURE — 87186 SC STD MICRODIL/AGAR DIL: CPT | Performed by: FAMILY MEDICINE

## 2023-10-01 PROCEDURE — 82550 ASSAY OF CK (CPK): CPT | Performed by: FAMILY MEDICINE

## 2023-10-01 PROCEDURE — 63600175 PHARM REV CODE 636 W HCPCS: Performed by: FAMILY MEDICINE

## 2023-10-01 PROCEDURE — 84484 ASSAY OF TROPONIN QUANT: CPT | Performed by: FAMILY MEDICINE

## 2023-10-01 PROCEDURE — 80053 COMPREHEN METABOLIC PANEL: CPT | Performed by: FAMILY MEDICINE

## 2023-10-01 PROCEDURE — 36415 COLL VENOUS BLD VENIPUNCTURE: CPT | Performed by: FAMILY MEDICINE

## 2023-10-01 PROCEDURE — 11000001 HC ACUTE MED/SURG PRIVATE ROOM

## 2023-10-01 PROCEDURE — 93010 ELECTROCARDIOGRAM REPORT: CPT | Mod: ,,, | Performed by: INTERNAL MEDICINE

## 2023-10-01 PROCEDURE — 93010 EKG 12-LEAD: ICD-10-PCS | Mod: ,,, | Performed by: INTERNAL MEDICINE

## 2023-10-01 PROCEDURE — 87088 URINE BACTERIA CULTURE: CPT | Performed by: FAMILY MEDICINE

## 2023-10-01 PROCEDURE — 25000003 PHARM REV CODE 250: Performed by: INTERNAL MEDICINE

## 2023-10-01 PROCEDURE — 82553 CREATINE MB FRACTION: CPT | Performed by: FAMILY MEDICINE

## 2023-10-01 RX ORDER — ENOXAPARIN SODIUM 100 MG/ML
30 INJECTION SUBCUTANEOUS EVERY 24 HOURS
Status: DISCONTINUED | OUTPATIENT
Start: 2023-10-02 | End: 2023-10-01

## 2023-10-01 RX ORDER — TALC
6 POWDER (GRAM) TOPICAL NIGHTLY PRN
Status: DISCONTINUED | OUTPATIENT
Start: 2023-10-01 | End: 2023-10-09 | Stop reason: HOSPADM

## 2023-10-01 RX ORDER — SODIUM CHLORIDE 0.9 % (FLUSH) 0.9 %
10 SYRINGE (ML) INJECTION
Status: DISCONTINUED | OUTPATIENT
Start: 2023-10-01 | End: 2023-10-09 | Stop reason: HOSPADM

## 2023-10-01 RX ORDER — GLUCAGON 1 MG
1 KIT INJECTION
Status: DISCONTINUED | OUTPATIENT
Start: 2023-10-01 | End: 2023-10-09 | Stop reason: HOSPADM

## 2023-10-01 RX ORDER — IBUPROFEN 200 MG
16 TABLET ORAL
Status: DISCONTINUED | OUTPATIENT
Start: 2023-10-01 | End: 2023-10-09 | Stop reason: HOSPADM

## 2023-10-01 RX ORDER — ENOXAPARIN SODIUM 100 MG/ML
40 INJECTION SUBCUTANEOUS EVERY 24 HOURS
Status: DISCONTINUED | OUTPATIENT
Start: 2023-10-02 | End: 2023-10-09 | Stop reason: HOSPADM

## 2023-10-01 RX ORDER — SODIUM CHLORIDE 9 MG/ML
INJECTION, SOLUTION INTRAVENOUS ONCE
Status: COMPLETED | OUTPATIENT
Start: 2023-10-01 | End: 2023-10-02

## 2023-10-01 RX ORDER — IBUPROFEN 200 MG
24 TABLET ORAL
Status: DISCONTINUED | OUTPATIENT
Start: 2023-10-01 | End: 2023-10-09 | Stop reason: HOSPADM

## 2023-10-01 RX ADMIN — CIPROFLOXACIN HYDROCHLORIDE AND HYDROCORTISONE 3 DROP: 2; 10 SUSPENSION/ DROPS AURICULAR (OTIC) at 10:10

## 2023-10-01 RX ADMIN — CEFTRIAXONE SODIUM 1 G: 1 INJECTION, POWDER, FOR SOLUTION INTRAMUSCULAR; INTRAVENOUS at 05:10

## 2023-10-01 NOTE — H&P
[facility] Medicine  History & Physical    Patient Name: Lanie Abad  MRN: [id number]  Patient Class: IP- Inpatient  Admission Date: 10/1/2023  Attending Physician:Benja Mi MD  Primary Care Provider: Kaycee Esparza FNP-C         Patient information was obtained from via telemed    Subjective:     Principal Problem:<principal problem not specified>    Chief Complaint:   Chief Complaint   Patient presents with    Weakness    Dizziness     Pt states she felt dizzy this morning        HPI: 89 yo female with hx IDDM, Colostomy, Rt mastectomy due to breast CA, Recent Left er infection lives alone gets around with wheelchair presents with a fall in the bathroom couldn't get up. She had another fall 4 days ago after her bathroom chair chair broke. Denies Chest pain, SOB, productive cough, N/V/D, has dysuria, and frequency. Accompanied by her children daughter and son      Past Medical History:   Diagnosis Date    Blood clotting disorder     Breast cancer     Closed comminuted intertrochanteric fracture of femur     Colostomy status     Diabetes mellitus, type 2     History of deep vein thrombophlebitis of lower extremity        Past Surgical History:   Procedure Laterality Date    BLADDER SUSPENSION      CHOLECYSTECTOMY      HYSTERECTOMY      INSERTION OF INTRAMEDULLARY ROSA ELENA Right 08/03/2018    femur    MASTECTOMY Right        Review of patient's allergies indicates:   Allergen Reactions    Pregabalin      Other reaction(s): unknown       No current facility-administered medications on file prior to encounter.     Current Outpatient Medications on File Prior to Encounter   Medication Sig    ciprofloxacin-dexAMETHasone 0.3-0.1% (CIPRODEX) 0.3-0.1 % DrpS Place 4 drops into both ears 2 (two) times daily.    liraglutide 0.6 mg/0.1 mL, 18 mg/3 mL, subq PNIJ (VICTOZA 2-RADHA) 0.6 mg/0.1 mL (18 mg/3 mL) PnIj pen Inject 1.8 mg into the skin once daily.    TRESIBA  FLEXTOUCH U-100 100 unit/mL (3 mL) insulin pen SMARTSI Unit(s) SUB-Q Every Evening    [DISCONTINUED] OZEMPIC 0.25 mg or 0.5 mg(2 mg/1.5 mL) pen injector Inject 2 mg into the skin every 7 days.    [DISCONTINUED] OZEMPIC 1 mg/dose (4 mg/3 mL) Inject 1 mg into the skin every 7 days.    [DISCONTINUED] semaglutide (OZEMPIC) 0.25 mg or 0.5 mg(2 mg/1.5 mL) pen injector INJECT 1MG (2 SHOTS OF 0.5MG) SUBQ WEEKLY AS DIRECTED - ROTATE INJECTION SITES    [DISCONTINUED] semaglutide (OZEMPIC) 1 mg/dose (4 mg/3 mL) Inject 1 mg into the skin.     Family History    None       Tobacco Use    Smoking status: Never    Smokeless tobacco: Never   Substance and Sexual Activity    Alcohol use: Never    Drug use: Never    Sexual activity: Not Currently     Review of Systems   Constitutional: Negative.    HENT:  Positive for ear pain.    Eyes: Negative.    Respiratory: Negative.     Cardiovascular: Negative.    Gastrointestinal: Negative.    Endocrine: Negative.    Genitourinary:  Positive for dysuria and frequency.   Musculoskeletal: Negative.    Skin: Negative.    Allergic/Immunologic: Negative.    Neurological:  Positive for weakness.   Hematological: Negative.    Psychiatric/Behavioral: Negative.       Objective:     Vital Signs (Most Recent):  Temp: 99.5 °F (37.5 °C) (10/01/23 1422)  Pulse: 104 (10/01/23 1643)  Resp: 20 (10/01/23 1643)  BP: 115/60 (10/01/23 1643)  SpO2: 96 % (10/01/23 1643) Vital Signs (24h Range):  Temp:  [99.5 °F (37.5 °C)] 99.5 °F (37.5 °C)  Pulse:  [] 104  Resp:  [2-20] 20  SpO2:  [93 %-99 %] 96 %  BP: (103-118)/(55-74) 115/60     Weight: 71.7 kg (158 lb)  Body mass index is 28.9 kg/m².     Physical Exam  Constitutional:       Appearance: Normal appearance. She is obese.   HENT:      Head: Normocephalic and atraumatic.      Mouth/Throat:      Pharynx: Oropharynx is clear.   Eyes:      Extraocular Movements: Extraocular movements intact.      Conjunctiva/sclera: Conjunctivae normal.      Pupils:  "Pupils are equal, round, and reactive to light.   Cardiovascular:      Rate and Rhythm: Regular rhythm. Tachycardia present.   Pulmonary:      Effort: Pulmonary effort is normal.      Breath sounds: Normal breath sounds.   Abdominal:      General: Abdomen is flat. Bowel sounds are normal.      Palpations: Abdomen is soft.      Comments: Colostomy in place   Musculoskeletal:         General: Normal range of motion.      Cervical back: Normal range of motion and neck supple.   Skin:     General: Skin is warm and dry.   Neurological:      General: No focal deficit present.      Mental Status: She is alert and oriented to person, place, and time.   Psychiatric:         Mood and Affect: Mood normal.              CRANIAL NERVES     CN III, IV, VI   Pupils are equal, round, and reactive to light.       Significant Labs: All pertinent labs within the past 24 hours have been reviewed.  A1C:   Recent Labs   Lab 09/11/23  1006   HGBA1C 8.8*     ABGs: No results for input(s): "PH", "PCO2", "HCO3", "POCSATURATED", "BE", "TOTALHB", "COHB", "METHB", "O2HB", "POCFIO2", "PO2" in the last 48 hours.  Bilirubin:   Recent Labs   Lab 10/01/23  1545   BILITOT 1.4*     Blood Culture: No results for input(s): "LABBLOO" in the last 48 hours.  BMP:   Recent Labs   Lab 10/01/23  1545   *   K 4.5   CO2 22   BUN 14.0   CREATININE 0.61*   CALCIUM 9.1     CBC:   Recent Labs   Lab 10/01/23  1545   WBC 13.84*   HGB 14.2   HCT 40.7        CMP:   Recent Labs   Lab 10/01/23  1545   *   K 4.5   CO2 22   BUN 14.0   CREATININE 0.61*   CALCIUM 9.1   ALBUMIN 3.8   BILITOT 1.4*   ALKPHOS 211*   AST 26   ALT 20     Cardiac Markers: No results for input(s): "CKMB", "MYOGLOBIN", "BNP", "TROPISTAT" in the last 48 hours.  Coagulation: No results for input(s): "PT", "INR", "APTT" in the last 48 hours.  Lactic Acid: No results for input(s): "LACTATE" in the last 48 hours.  Lipase: No results for input(s): "LIPASE" in the last 48 hours.  Lipid " "Panel: No results for input(s): "CHOL", "HDL", "LDLCALC", "TRIG", "CHOLHDL" in the last 48 hours.  Magnesium: No results for input(s): "MG" in the last 48 hours.  Pathology Results  (Last 10 years)      None          POCT Glucose:   Recent Labs   Lab 10/01/23  1440   POCTGLUCOSE >500*     Prealbumin: No results for input(s): "PREALBUMIN" in the last 48 hours.  Respiratory Culture: No results for input(s): "GSRESP", "RESPIRATORYC" in the last 48 hours.  Troponin:   Recent Labs   Lab 10/01/23  1545   TROPONINI <0.012     TSH: No results for input(s): "TSH" in the last 4320 hours.  Urine Culture: No results for input(s): "LABURIN" in the last 48 hours.  Urine Studies:   Recent Labs   Lab 10/01/23  1643   APPEARANCEUA Cloudy*   PROTEINUA Trace*   BILIRUBINUA Negative   UROBILINOGEN 1.0   LEUKOCYTESUR Small*   RBCUA 3-5   WBCUA 11-20*   BACTERIA 4+*       Significant Imaging:    CXR  Left sided atelectasis    Assessment/Plan:     No notes have been filed under this hospital service.  Service: Hospital Medicine    VTE Risk Mitigation (From admission, onward)         Ordered     IP VTE HIGH RISK PATIENT  Once         10/01/23 1712     Place sequential compression device  Until discontinued         10/01/23 1712               - Clinical Sepsis due to UTI check Ucx, Bcx, started on Rocephin. Follow up with Ucx  -Weakness likely due to IVVD, Hydrate. PT/OT evaluate  - IDDM Hgb AC 8.8, check Accu checks, and cover with SSI  -Left otitis extrna continue cipro drops  - hx Breast CA  -S/p Colostomy  -DVT ppx on Lovenox  Pt is Full code, SDM is her Daughter Laila Mckay  Pt is seen and examined via telemed. Pt is in [facility], I am in [place], [place]. Nursing staff assisted with evaluation of the patient  Pt is being admitted as an inpatient to the hospitalist for further evaluation andtretament                Benja Mi MD  Department of Hospital Medicine  [facility]-Emergency Dept  "

## 2023-10-01 NOTE — ED PROVIDER NOTES
Encounter Date: 10/1/2023    SCRIBE #1 NOTE: I, am scribing for, and in the presence of,  . I have scribed the entire note.       History     Chief Complaint   Patient presents with    Weakness    Dizziness     Pt states she felt dizzy this morning     Patient says that she felt dizzy in the morning and she had a fall she had a fall few days ago in the bathtub where higher tub transfer bench broke the patient knows and said that it happened because the bench broke and she was given a new 1 she denies any head trauma since the patient knows the exact details tells me that the brain is fine patient says that she had colostomy and she has a colostomy bag and that was in 2009 the patient has had mastectomy the patient says that it was less than 1 year ago in the brain is working fine she was able to give a pretty detailed account of her problems she says that her blood sugar medicine has been changed to a cheaper kind which is not controlling the blood sugar and her blood sugar was greater than 500 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 the patient is also complaining of dizziness I am going to check some cardiac workup        Review of patient's allergies indicates:   Allergen Reactions    Pregabalin      Other reaction(s): unknown     Past Medical History:   Diagnosis Date    Blood clotting disorder     Breast cancer     Closed comminuted intertrochanteric fracture of femur     Colostomy status     Diabetes mellitus, type 2     History of deep vein thrombophlebitis of lower extremity      Past Surgical History:   Procedure Laterality Date    BLADDER SUSPENSION      CHOLECYSTECTOMY      HYSTERECTOMY      INSERTION OF INTRAMEDULLARY ROSA ELENA Right 08/03/2018    femur    MASTECTOMY Right      History reviewed. No pertinent family history.  Social History     Tobacco Use    Smoking status: Never    Smokeless tobacco: Never   Substance Use Topics    Alcohol use: Never    Drug use: Never     Review of  Systems   Constitutional:  Negative for fever.   HENT:  Negative for congestion and sore throat.    Eyes:  Negative for pain, discharge and itching.   Respiratory:  Negative for shortness of breath.    Cardiovascular:  Negative for chest pain.   Gastrointestinal:  Negative for nausea.   Endocrine: Negative for cold intolerance, heat intolerance, polydipsia and polyphagia.   Genitourinary:  Negative for dysuria.   Musculoskeletal:  Negative for back pain.   Skin:  Negative for color change, pallor and rash.   Neurological:  Positive for dizziness and light-headedness. Negative for weakness and headaches.   Hematological:  Does not bruise/bleed easily.       Physical Exam     Initial Vitals [10/01/23 1422]   BP Pulse Resp Temp SpO2   118/74 103 20 99.5 °F (37.5 °C) 97 %      MAP       --         Physical Exam    Nursing note and vitals reviewed.  Constitutional: She appears well-developed.   Eyes: Pupils are equal, round, and reactive to light.   Neck:   Normal range of motion.  Cardiovascular:  Normal rate, regular rhythm, normal heart sounds and intact distal pulses.           Pulmonary/Chest: Breath sounds normal.   Abdominal: Abdomen is soft. Bowel sounds are normal.   Musculoskeletal:         General: Normal range of motion.      Cervical back: Normal range of motion.     Skin: Skin is warm.   Psychiatric: She has a normal mood and affect.         ED Course   Procedures  Labs Reviewed   COMPREHENSIVE METABOLIC PANEL - Abnormal; Notable for the following components:       Result Value    Sodium Level 126 (*)     Chloride 94 (*)     Glucose Level 378 (*)     Creatinine 0.61 (*)     Bilirubin Total 1.4 (*)     Alkaline Phosphatase 211 (*)     BUN/Creatinine Ratio 23 (*)     All other components within normal limits   NT-PRO NATRIURETIC PEPTIDE - Abnormal; Notable for the following components:    ProBNP 225.0 (*)     All other components within normal limits   CBC WITH DIFFERENTIAL - Abnormal; Notable for the  following components:    WBC 13.84 (*)     MPV 9.2 (*)     Neut % 88.4 (*)     Lymph % 2.7 (*)     Eos % 0.0 (*)     Lymph # 0.37 (*)     Neut # 12.23 (*)     Mono # 1.14 (*)     Eos # 0.00 (*)     IG# 0.06 (*)     All other components within normal limits   URINALYSIS, REFLEX TO URINE CULTURE - Abnormal; Notable for the following components:    Appearance, UA Cloudy (*)     Protein, UA Trace (*)     Blood, UA Moderate (*)     Nitrites, UA Positive (*)     Leukocyte Esterase, UA Small (*)     All other components within normal limits    Narrative:      URINE STABILITY IS 2 HOURS AT ROOM TEMP OR    SIX HOURS REFRIGERATED. PERFORMING TESTING ON    SPECIMENS GREATER THAN THIS AGE MAY AFFECT THE    FOLLOWING TESTS:    PH          SPECIFIC GRAVITY           BLOOD    CLARITY     BILIRUBIN               UROBILINOGEN   URINALYSIS, MICROSCOPIC - Abnormal; Notable for the following components:    Bacteria, UA 4+ (*)     WBC, UA 11-20 (*)     All other components within normal limits   POCT GLUCOSE - Abnormal; Notable for the following components:    POCT Glucose >500 (*)     All other components within normal limits   POCT GLUCOSE - Abnormal; Notable for the following components:    POCT Glucose 356 (*)     All other components within normal limits   CK-MB - Normal   CK - Normal   TROPONIN I - Normal   CULTURE, URINE   CBC W/ AUTO DIFFERENTIAL    Narrative:     The following orders were created for panel order CBC Auto Differential.  Procedure                               Abnormality         Status                     ---------                               -----------         ------                     CBC with Differential[[id number]]       Abnormal            Final result                 Please view results for these tests on the individual orders.   POCT GLUCOSE MONITORING CONTINUOUS          Imaging Results              X-Ray Chest AP Portable (Final result)  Result time 10/01/23 16:30:54      Final result by Jermaine  Jose SAINZ MD (10/01/23 16:30:54)                   Impression:      1. There are hazy and reticular opacities in left lung base which may reflect atelectasis or early infiltrate.      Electronically signed by: Jose Dean MD  Date:    10/01/2023  Time:    16:30               Narrative:    EXAMINATION:  XR CHEST AP PORTABLE    CLINICAL HISTORY:  Dizziness and giddiness.    COMPARISON:  Chest x-ray 12/26/2018    FINDINGS:  Frontal view of the chest was obtained.  The cardiac silhouette is within normal limits for size. The aorta is mildly tortuous and partially calcified.  There are hazy and reticular opacities in left lung base which may reflect atelectasis or early infiltrate.  No evidence of pneumothorax or pleural effusion is seen.  No acute displaced fracture or dislocation is present.                                       Medications   sodium chloride 0.9% flush 10 mL (has no administration in time range)   melatonin tablet 6 mg (has no administration in time range)   cefTRIAXone (ROCEPHIN) 1 g in dextrose 5 % in water (D5W) 100 mL IVPB (MB+) (1 g Intravenous New Bag 10/1/23 1734)   glucose chewable tablet 16 g (has no administration in time range)   glucose chewable tablet 24 g (has no administration in time range)   glucagon (human recombinant) injection 1 mg (has no administration in time range)   dextrose 10% bolus 125 mL 125 mL (has no administration in time range)   dextrose 10% bolus 250 mL 250 mL (has no administration in time range)   ciprofloxacin-hydrocortisone 0.2-1% otic suspension 3 drop (has no administration in time range)   insulin detemir U-100 (Levemir) pen 14 Units (has no administration in time range)   0.9%  NaCl infusion (has no administration in time range)   enoxaparin injection 40 mg (has no administration in time range)     Medical Decision Making  Amount and/or Complexity of Data Reviewed  Labs: ordered.  Radiology: ordered.    Risk  OTC drugs.  Prescription drug management.  Decision  regarding hospitalization.                               Clinical Impression:   Final diagnoses:  [R42] Dizziness  [R73.9] Hyperglycemia (Primary)        ED Disposition Condition    Admit Stable                Leo Car MD  10/01/23 0970

## 2023-10-01 NOTE — HPI
89 yo female with hx IDDM, Colostomy, Rt mastectomy due to breast CA, Recent Left er infection lives alone gets around with wheelchair presents with a fall in the bathroom couldn't get up. She had another fall 4 days ago after her bathroom chair chair broke. Denies Chest pain, SOB, productive cough, N/V/D, has dysuria, and frequency. Accompanied by her children daughter and son

## 2023-10-01 NOTE — SUBJECTIVE & OBJECTIVE
Past Medical History:   Diagnosis Date    Blood clotting disorder     Breast cancer     Closed comminuted intertrochanteric fracture of femur     Colostomy status     Diabetes mellitus, type 2     History of deep vein thrombophlebitis of lower extremity        Past Surgical History:   Procedure Laterality Date    BLADDER SUSPENSION      CHOLECYSTECTOMY      HYSTERECTOMY      INSERTION OF INTRAMEDULLARY ROSA ELENA Right 2018    femur    MASTECTOMY Right        Review of patient's allergies indicates:   Allergen Reactions    Pregabalin      Other reaction(s): unknown       No current facility-administered medications on file prior to encounter.     Current Outpatient Medications on File Prior to Encounter   Medication Sig    ciprofloxacin-dexAMETHasone 0.3-0.1% (CIPRODEX) 0.3-0.1 % DrpS Place 4 drops into both ears 2 (two) times daily.    liraglutide 0.6 mg/0.1 mL, 18 mg/3 mL, subq PNIJ (VICTOZA 2-RADHA) 0.6 mg/0.1 mL (18 mg/3 mL) PnIj pen Inject 1.8 mg into the skin once daily.    TRESIBA FLEXTOUCH U-100 100 unit/mL (3 mL) insulin pen SMARTSI Unit(s) SUB-Q Every Evening    [DISCONTINUED] OZEMPIC 0.25 mg or 0.5 mg(2 mg/1.5 mL) pen injector Inject 2 mg into the skin every 7 days.    [DISCONTINUED] OZEMPIC 1 mg/dose (4 mg/3 mL) Inject 1 mg into the skin every 7 days.    [DISCONTINUED] semaglutide (OZEMPIC) 0.25 mg or 0.5 mg(2 mg/1.5 mL) pen injector INJECT 1MG (2 SHOTS OF 0.5MG) SUBQ WEEKLY AS DIRECTED - ROTATE INJECTION SITES    [DISCONTINUED] semaglutide (OZEMPIC) 1 mg/dose (4 mg/3 mL) Inject 1 mg into the skin.     Family History    None       Tobacco Use    Smoking status: Never    Smokeless tobacco: Never   Substance and Sexual Activity    Alcohol use: Never    Drug use: Never    Sexual activity: Not Currently     Review of Systems   Constitutional: Negative.    HENT:  Positive for ear pain.    Eyes: Negative.    Respiratory: Negative.     Cardiovascular: Negative.    Gastrointestinal: Negative.    Endocrine:  Negative.    Genitourinary:  Positive for dysuria and frequency.   Musculoskeletal: Negative.    Skin: Negative.    Allergic/Immunologic: Negative.    Neurological:  Positive for weakness.   Hematological: Negative.    Psychiatric/Behavioral: Negative.       Objective:     Vital Signs (Most Recent):  Temp: 99.5 °F (37.5 °C) (10/01/23 1422)  Pulse: 104 (10/01/23 1643)  Resp: 20 (10/01/23 1643)  BP: 115/60 (10/01/23 1643)  SpO2: 96 % (10/01/23 1643) Vital Signs (24h Range):  Temp:  [99.5 °F (37.5 °C)] 99.5 °F (37.5 °C)  Pulse:  [] 104  Resp:  [2-20] 20  SpO2:  [93 %-99 %] 96 %  BP: (103-118)/(55-74) 115/60     Weight: 71.7 kg (158 lb)  Body mass index is 28.9 kg/m².     Physical Exam  Constitutional:       Appearance: Normal appearance. She is obese.   HENT:      Head: Normocephalic and atraumatic.      Mouth/Throat:      Pharynx: Oropharynx is clear.   Eyes:      Extraocular Movements: Extraocular movements intact.      Conjunctiva/sclera: Conjunctivae normal.      Pupils: Pupils are equal, round, and reactive to light.   Cardiovascular:      Rate and Rhythm: Regular rhythm. Tachycardia present.   Pulmonary:      Effort: Pulmonary effort is normal.      Breath sounds: Normal breath sounds.   Abdominal:      General: Abdomen is flat. Bowel sounds are normal.      Palpations: Abdomen is soft.      Comments: Colostomy in place   Musculoskeletal:         General: Normal range of motion.      Cervical back: Normal range of motion and neck supple.   Skin:     General: Skin is warm and dry.   Neurological:      General: No focal deficit present.      Mental Status: She is alert and oriented to person, place, and time.   Psychiatric:         Mood and Affect: Mood normal.              CRANIAL NERVES     CN III, IV, VI   Pupils are equal, round, and reactive to light.       Significant Labs: All pertinent labs within the past 24 hours have been reviewed.  A1C:   Recent Labs   Lab 09/11/23  1006   HGBA1C 8.8*     ABGs: No  "results for input(s): "PH", "PCO2", "HCO3", "POCSATURATED", "BE", "TOTALHB", "COHB", "METHB", "O2HB", "POCFIO2", "PO2" in the last 48 hours.  Bilirubin:   Recent Labs   Lab 10/01/23  1545   BILITOT 1.4*     Blood Culture: No results for input(s): "LABBLOO" in the last 48 hours.  BMP:   Recent Labs   Lab 10/01/23  1545   *   K 4.5   CO2 22   BUN 14.0   CREATININE 0.61*   CALCIUM 9.1     CBC:   Recent Labs   Lab 10/01/23  1545   WBC 13.84*   HGB 14.2   HCT 40.7        CMP:   Recent Labs   Lab 10/01/23  1545   *   K 4.5   CO2 22   BUN 14.0   CREATININE 0.61*   CALCIUM 9.1   ALBUMIN 3.8   BILITOT 1.4*   ALKPHOS 211*   AST 26   ALT 20     Cardiac Markers: No results for input(s): "CKMB", "MYOGLOBIN", "BNP", "TROPISTAT" in the last 48 hours.  Coagulation: No results for input(s): "PT", "INR", "APTT" in the last 48 hours.  Lactic Acid: No results for input(s): "LACTATE" in the last 48 hours.  Lipase: No results for input(s): "LIPASE" in the last 48 hours.  Lipid Panel: No results for input(s): "CHOL", "HDL", "LDLCALC", "TRIG", "CHOLHDL" in the last 48 hours.  Magnesium: No results for input(s): "MG" in the last 48 hours.  Pathology Results  (Last 10 years)      None          POCT Glucose:   Recent Labs   Lab 10/01/23  1440   POCTGLUCOSE >500*     Prealbumin: No results for input(s): "PREALBUMIN" in the last 48 hours.  Respiratory Culture: No results for input(s): "GSRESP", "RESPIRATORYC" in the last 48 hours.  Troponin:   Recent Labs   Lab 10/01/23  1545   TROPONINI <0.012     TSH: No results for input(s): "TSH" in the last 4320 hours.  Urine Culture: No results for input(s): "LABURIN" in the last 48 hours.  Urine Studies:   Recent Labs   Lab 10/01/23  1643   APPEARANCEUA Cloudy*   PROTEINUA Trace*   BILIRUBINUA Negative   UROBILINOGEN 1.0   LEUKOCYTESUR Small*   RBCUA 3-5   WBCUA 11-20*   BACTERIA 4+*       Significant Imaging:    CXR  Left sided atelectasis  "

## 2023-10-02 PROBLEM — A41.9 SEPSIS: Status: ACTIVE | Noted: 2023-10-02

## 2023-10-02 PROBLEM — N39.0 UTI (URINARY TRACT INFECTION): Status: ACTIVE | Noted: 2023-10-02

## 2023-10-02 PROBLEM — R29.6 FALLS FREQUENTLY: Status: ACTIVE | Noted: 2023-10-02

## 2023-10-02 PROBLEM — L89.152 DECUBITUS ULCER OF COCCYX, STAGE 2: Status: ACTIVE | Noted: 2023-10-02

## 2023-10-02 PROBLEM — Z93.3 COLOSTOMY IN PLACE: Status: ACTIVE | Noted: 2023-10-02

## 2023-10-02 PROBLEM — E11.65 UNCONTROLLED DIABETES MELLITUS WITH HYPERGLYCEMIA, WITH LONG-TERM CURRENT USE OF INSULIN: Status: ACTIVE | Noted: 2023-09-19

## 2023-10-02 PROBLEM — E11.49 CONTROLLED TYPE 2 DIABETES MELLITUS WITH NEUROLOGIC COMPLICATION, WITH LONG-TERM CURRENT USE OF INSULIN: Status: ACTIVE | Noted: 2023-09-19

## 2023-10-02 LAB
ALBUMIN SERPL-MCNC: 3.3 G/DL (ref 3.4–5)
ALBUMIN/GLOB SERPL: 1.3 RATIO
ALP SERPL-CCNC: 177 UNIT/L (ref 50–144)
ALT SERPL-CCNC: 17 UNIT/L (ref 1–45)
ANION GAP SERPL CALC-SCNC: 11 MEQ/L (ref 2–13)
AST SERPL-CCNC: 28 UNIT/L (ref 14–36)
BASOPHILS # BLD AUTO: 0.02 X10(3)/MCL (ref 0.01–0.08)
BASOPHILS NFR BLD AUTO: 0.2 % (ref 0.1–1.2)
BILIRUB SERPL-MCNC: 1.1 MG/DL (ref 0–1)
BUN SERPL-MCNC: 14 MG/DL (ref 7–20)
CALCIUM SERPL-MCNC: 8.2 MG/DL (ref 8.4–10.2)
CHLORIDE SERPL-SCNC: 96 MMOL/L (ref 98–110)
CO2 SERPL-SCNC: 16 MMOL/L (ref 21–32)
CREAT SERPL-MCNC: 0.51 MG/DL (ref 0.66–1.25)
CREAT/UREA NIT SERPL: 27 (ref 12–20)
EOSINOPHIL # BLD AUTO: 0 X10(3)/MCL (ref 0.04–0.36)
EOSINOPHIL NFR BLD AUTO: 0 % (ref 0.7–7)
ERYTHROCYTE [DISTWIDTH] IN BLOOD BY AUTOMATED COUNT: 13.4 % (ref 11–14.5)
GFR SERPLBLD CREATININE-BSD FMLA CKD-EPI: 89 MLS/MIN/1.73/M2
GLOBULIN SER-MCNC: 2.6 GM/DL (ref 2–3.9)
GLUCOSE SERPL-MCNC: 284 MG/DL (ref 70–115)
HCT VFR BLD AUTO: 38.6 % (ref 36–48)
HGB BLD-MCNC: 13.5 G/DL (ref 11.8–16)
IMM GRANULOCYTES # BLD AUTO: 0.07 X10(3)/MCL (ref 0–0.03)
IMM GRANULOCYTES NFR BLD AUTO: 0.6 % (ref 0–0.5)
LYMPHOCYTES # BLD AUTO: 0.79 X10(3)/MCL (ref 1.16–3.74)
LYMPHOCYTES NFR BLD AUTO: 6.6 % (ref 20–55)
MCH RBC QN AUTO: 31.5 PG (ref 27–34)
MCHC RBC AUTO-ENTMCNC: 35 G/DL (ref 31–37)
MCV RBC AUTO: 90.2 FL (ref 79–99)
MONOCYTES # BLD AUTO: 1.42 X10(3)/MCL (ref 0.24–0.36)
MONOCYTES NFR BLD AUTO: 11.8 % (ref 4.7–12.5)
NEUTROPHILS # BLD AUTO: 9.69 X10(3)/MCL (ref 1.56–6.13)
NEUTROPHILS NFR BLD AUTO: 80.8 % (ref 37–73)
NRBC BLD AUTO-RTO: 0 %
PLATELET # BLD AUTO: 193 X10(3)/MCL (ref 140–371)
PMV BLD AUTO: 9 FL (ref 9.4–12.4)
POCT GLUCOSE: 282 MG/DL (ref 70–110)
POCT GLUCOSE: 284 MG/DL (ref 70–110)
POCT GLUCOSE: 290 MG/DL (ref 70–110)
POCT GLUCOSE: 357 MG/DL (ref 70–110)
POCT GLUCOSE: 373 MG/DL (ref 70–110)
POTASSIUM SERPL-SCNC: 4.3 MMOL/L (ref 3.5–5.1)
PROT SERPL-MCNC: 5.9 GM/DL (ref 6.3–8.2)
RBC # BLD AUTO: 4.28 X10(6)/MCL (ref 4–5.1)
SODIUM SERPL-SCNC: 123 MMOL/L (ref 135–145)
WBC # SPEC AUTO: 11.99 X10(3)/MCL (ref 4–11.5)

## 2023-10-02 PROCEDURE — 80053 COMPREHEN METABOLIC PANEL: CPT | Performed by: FAMILY MEDICINE

## 2023-10-02 PROCEDURE — 25000003 PHARM REV CODE 250: Performed by: FAMILY MEDICINE

## 2023-10-02 PROCEDURE — 63600175 PHARM REV CODE 636 W HCPCS: Performed by: INTERNAL MEDICINE

## 2023-10-02 PROCEDURE — 63600175 PHARM REV CODE 636 W HCPCS: Performed by: FAMILY MEDICINE

## 2023-10-02 PROCEDURE — 94761 N-INVAS EAR/PLS OXIMETRY MLT: CPT

## 2023-10-02 PROCEDURE — 97110 THERAPEUTIC EXERCISES: CPT

## 2023-10-02 PROCEDURE — 36415 COLL VENOUS BLD VENIPUNCTURE: CPT | Performed by: FAMILY MEDICINE

## 2023-10-02 PROCEDURE — 25000003 PHARM REV CODE 250: Performed by: INTERNAL MEDICINE

## 2023-10-02 PROCEDURE — 97530 THERAPEUTIC ACTIVITIES: CPT

## 2023-10-02 PROCEDURE — 97161 PT EVAL LOW COMPLEX 20 MIN: CPT

## 2023-10-02 PROCEDURE — 85025 COMPLETE CBC W/AUTO DIFF WBC: CPT | Performed by: FAMILY MEDICINE

## 2023-10-02 PROCEDURE — 99900035 HC TECH TIME PER 15 MIN (STAT)

## 2023-10-02 PROCEDURE — 11000001 HC ACUTE MED/SURG PRIVATE ROOM

## 2023-10-02 RX ORDER — ACETAMINOPHEN 325 MG/1
650 TABLET ORAL EVERY 6 HOURS PRN
Status: DISCONTINUED | OUTPATIENT
Start: 2023-10-02 | End: 2023-10-09 | Stop reason: HOSPADM

## 2023-10-02 RX ORDER — INSULIN ASPART 100 [IU]/ML
0-5 INJECTION, SOLUTION INTRAVENOUS; SUBCUTANEOUS EVERY 6 HOURS PRN
Status: DISCONTINUED | OUTPATIENT
Start: 2023-10-02 | End: 2023-10-09 | Stop reason: HOSPADM

## 2023-10-02 RX ORDER — GLUCAGON 1 MG
1 KIT INJECTION
Status: DISCONTINUED | OUTPATIENT
Start: 2023-10-02 | End: 2023-10-09 | Stop reason: HOSPADM

## 2023-10-02 RX ADMIN — ACETAMINOPHEN 650 MG: 325 TABLET, FILM COATED ORAL at 04:10

## 2023-10-02 RX ADMIN — CEFTRIAXONE SODIUM 1 G: 1 INJECTION, POWDER, FOR SOLUTION INTRAMUSCULAR; INTRAVENOUS at 05:10

## 2023-10-02 RX ADMIN — CIPROFLOXACIN HYDROCHLORIDE AND HYDROCORTISONE 3 DROP: 2; 10 SUSPENSION/ DROPS AURICULAR (OTIC) at 08:10

## 2023-10-02 RX ADMIN — SODIUM CHLORIDE: 9 INJECTION, SOLUTION INTRAVENOUS at 05:10

## 2023-10-02 RX ADMIN — INSULIN DETEMIR 14 UNITS: 100 INJECTION, SOLUTION SUBCUTANEOUS at 08:10

## 2023-10-02 RX ADMIN — ENOXAPARIN SODIUM 40 MG: 40 INJECTION SUBCUTANEOUS at 04:10

## 2023-10-02 RX ADMIN — INSULIN ASPART 1 UNITS: 100 INJECTION, SOLUTION INTRAVENOUS; SUBCUTANEOUS at 09:10

## 2023-10-02 NOTE — PROGRESS NOTES
[facility]urg  [facility] Medicine  Progress Note    Patient Name: Lanie Abad  MRN: [id number]  Patient Class: IP- Inpatient   Admission Date: 10/1/2023  Length of Stay: 1 days  Attending Physician: Garima Lindo MD  Primary Care Provider: Kaycee Esparza FNP-C        Subjective:     Principal Problem:Sepsis        HPI:  89 yo female with hx IDDM, Colostomy, Rt mastectomy due to breast CA, Recent Left er infection lives alone gets around with wheelchair presents with a fall in the bathroom couldn't get up. She had another fall 4 days ago after her bathroom chair chair broke. Denies Chest pain, SOB, productive cough, N/V/D, has dysuria, and frequency. Accompanied by her children daughter and son      Overview/Hospital Course:  10/02/2023 pt admitted with frequent falls at home.  Lives with her son, she is a very poor historian, denies any pain complaints due to falls.        Past Medical History:   Diagnosis Date    Blood clotting disorder     Breast cancer     Closed comminuted intertrochanteric fracture of femur     Colostomy status     Diabetes mellitus, type 2     History of deep vein thrombophlebitis of lower extremity        Past Surgical History:   Procedure Laterality Date    BLADDER SUSPENSION      CHOLECYSTECTOMY      HYSTERECTOMY      INSERTION OF INTRAMEDULLARY ROSA ELENA Right 2018    femur    MASTECTOMY Right        Review of patient's allergies indicates:   Allergen Reactions    Pregabalin      Other reaction(s): unknown       No current facility-administered medications on file prior to encounter.     Current Outpatient Medications on File Prior to Encounter   Medication Sig    ciprofloxacin-dexAMETHasone 0.3-0.1% (CIPRODEX) 0.3-0.1 % DrpS Place 4 drops into both ears 2 (two) times daily.    TRESIBA FLEXTOUCH U-100 100 unit/mL (3 mL) insulin pen SMARTSI Unit(s) SUB-Q Every Evening    liraglutide 0.6 mg/0.1 mL, 18 mg/3 mL, subq PNIJ (VICTOZA 2-RADHA) 0.6  mg/0.1 mL (18 mg/3 mL) PnIj pen Inject 1.8 mg into the skin once daily.    [DISCONTINUED] OZEMPIC 0.25 mg or 0.5 mg(2 mg/1.5 mL) pen injector Inject 2 mg into the skin every 7 days.    [DISCONTINUED] OZEMPIC 1 mg/dose (4 mg/3 mL) Inject 1 mg into the skin every 7 days.    [DISCONTINUED] semaglutide (OZEMPIC) 0.25 mg or 0.5 mg(2 mg/1.5 mL) pen injector INJECT 1MG (2 SHOTS OF 0.5MG) SUBQ WEEKLY AS DIRECTED - ROTATE INJECTION SITES    [DISCONTINUED] semaglutide (OZEMPIC) 1 mg/dose (4 mg/3 mL) Inject 1 mg into the skin.     Family History    None       Tobacco Use    Smoking status: Never    Smokeless tobacco: Never   Substance and Sexual Activity    Alcohol use: Never    Drug use: Never    Sexual activity: Not Currently     Review of Systems   Constitutional: Negative.    HENT:  Positive for ear pain.    Eyes: Negative.    Respiratory: Negative.     Cardiovascular: Negative.    Gastrointestinal: Negative.    Endocrine: Negative.    Genitourinary:  Positive for dysuria and frequency.   Musculoskeletal: Negative.    Skin: Negative.    Allergic/Immunologic: Negative.    Neurological:  Positive for weakness.   Hematological: Negative.    Psychiatric/Behavioral: Negative.       Objective:     Vital Signs (Most Recent):  Temp: 98.3 °F (36.8 °C) (10/02/23 1039)  Pulse: 88 (10/02/23 1039)  Resp: 18 (10/02/23 1039)  BP: 136/76 (10/02/23 1039)  SpO2: 96 % (10/02/23 1039) Vital Signs (24h Range):  Temp:  [98.3 °F (36.8 °C)-99.5 °F (37.5 °C)] 98.3 °F (36.8 °C)  Pulse:  [] 88  Resp:  [2-20] 18  SpO2:  [93 %-99 %] 96 %  BP: (103-137)/(55-80) 136/76     Weight: 72.1 kg (159 lb)  Body mass index is 29.08 kg/m².     Physical Exam  Constitutional:       Appearance: Normal appearance. She is obese.   HENT:      Head: Normocephalic and atraumatic.      Mouth/Throat:      Pharynx: Oropharynx is clear.   Eyes:      Extraocular Movements: Extraocular movements intact.      Conjunctiva/sclera: Conjunctivae normal.      Pupils:  "Pupils are equal, round, and reactive to light.   Cardiovascular:      Rate and Rhythm: Regular rhythm. Tachycardia present.   Pulmonary:      Effort: Pulmonary effort is normal.      Breath sounds: Normal breath sounds.   Abdominal:      General: Abdomen is flat. Bowel sounds are normal.      Palpations: Abdomen is soft.      Comments: Colostomy in place   Musculoskeletal:         General: Normal range of motion.      Cervical back: Normal range of motion and neck supple.   Skin:     General: Skin is warm and dry.   Neurological:      General: No focal deficit present.      Mental Status: She is alert and oriented to person, place, and time. Mental status is at baseline.      Comments: Speech is difficult to understand with slurring, left side lower facial droop   Psychiatric:         Mood and Affect: Mood normal.              CRANIAL NERVES     CN III, IV, VI   Pupils are equal, round, and reactive to light.       Significant Labs: All pertinent labs within the past 24 hours have been reviewed.  A1C:   Recent Labs   Lab 09/11/23  1006   HGBA1C 8.8*       ABGs: No results for input(s): "PH", "PCO2", "HCO3", "POCSATURATED", "BE", "TOTALHB", "COHB", "METHB", "O2HB", "POCFIO2", "PO2" in the last 48 hours.  Bilirubin:   Recent Labs   Lab 10/01/23  1545 10/02/23  0428   BILITOT 1.4* 1.1*       Blood Culture: No results for input(s): "LABBLOO" in the last 48 hours.  BMP:   Recent Labs   Lab 10/02/23  0428   *   K 4.3   CO2 16*   BUN 14.0   CREATININE 0.51*   CALCIUM 8.2*       CBC:   Recent Labs   Lab 10/01/23  1545 10/02/23  0428   WBC 13.84* 11.99*   HGB 14.2 13.5   HCT 40.7 38.6    193       CMP:   Recent Labs   Lab 10/01/23  1545 10/02/23  0428   * 123*   K 4.5 4.3   CO2 22 16*   BUN 14.0 14.0   CREATININE 0.61* 0.51*   CALCIUM 9.1 8.2*   ALBUMIN 3.8 3.3*   BILITOT 1.4* 1.1*   ALKPHOS 211* 177*   AST 26 28   ALT 20 17       Cardiac Markers: No results for input(s): "CKMB", "MYOGLOBIN", "BNP", " ""TROPISTAT" in the last 48 hours.  Coagulation: No results for input(s): "PT", "INR", "APTT" in the last 48 hours.  Lactic Acid: No results for input(s): "LACTATE" in the last 48 hours.  Lipase: No results for input(s): "LIPASE" in the last 48 hours.  Lipid Panel: No results for input(s): "CHOL", "HDL", "LDLCALC", "TRIG", "CHOLHDL" in the last 48 hours.  Magnesium: No results for input(s): "MG" in the last 48 hours.  Pathology Results  (Last 10 years)      None          POCT Glucose:   Recent Labs   Lab 10/02/23  0220 10/02/23  0716 10/02/23  1044   POCTGLUCOSE 290* 282* 357*       Prealbumin: No results for input(s): "PREALBUMIN" in the last 48 hours.  Respiratory Culture: No results for input(s): "GSRESP", "RESPIRATORYC" in the last 48 hours.  Troponin:   Recent Labs   Lab 10/01/23  1545   TROPONINI <0.012       TSH: No results for input(s): "TSH" in the last 4320 hours.  Urine Culture: No results for input(s): "LABURIN" in the last 48 hours.  Urine Studies:   Recent Labs   Lab 10/01/23  1643   APPEARANCEUA Cloudy*   PROTEINUA Trace*   BILIRUBINUA Negative   UROBILINOGEN 1.0   LEUKOCYTESUR Small*   RBCUA 3-5   WBCUA 11-20*   BACTERIA 4+*         Significant Imaging:    CXR  Left sided atelectasis      Assessment/Plan:      * Sepsis  This patient does have evidence of infective focus  My overall impression is sepsis.  Source: Urinary Tract  Antibiotics given-   Antibiotics (72h ago, onward)    Start     Stop Route Frequency Ordered    10/01/23 2100  ciprofloxacin-hydrocortisone 0.2-1% otic suspension 3 drop         -- LEFT EAR 2 times daily 10/01/23 1712    10/01/23 1815  cefTRIAXone (ROCEPHIN) 1 g in dextrose 5 % in water (D5W) 100 mL IVPB (MB+)         -- IV Every 24 hours (non-standard times) 10/01/23 1712        Latest lactate reviewed-  No results for input(s): "LACTATE" in the last 72 hours.  Organ dysfunction indicated by Encephalopathy    Fluid challenge Actual Body weight- Patient will receive 30ml/kg " actual body weight to calculate fluid bolus for treatment of septic shock.     Post- resuscitation assessment No - Post resuscitation assessment not needed       Will Not start Pressors- Levophed for MAP of 65  Source control achieved by: contiue iv abx    UTI (urinary tract infection)  Continue iv abx  F/u on cx      Uncontrolled diabetes mellitus with hyperglycemia, with long-term current use of insulin  accuchecks  Increase detemir to 20U  Sugars over 500 on admit have been greater than 200  ssi        Falls frequently  Will consult PT  Case management to discuss with family, may consider SNF level of care post d/c        VTE Risk Mitigation (From admission, onward)         Ordered     enoxaparin injection 40 mg  Every 24 hours         10/01/23 1740     IP VTE HIGH RISK PATIENT  Once         10/01/23 1712     Place sequential compression device  Until discontinued         10/01/23 1712                Discharge Planning   MIKE:      Code Status: Full Code   Is the patient medically ready for discharge?:     Reason for patient still in hospital (select all that apply): Patient trending condition, Laboratory test, Consult recommendations and PT / OT recommendations                     Garima Lindo MD  Department of Hospital Medicine   [facility]

## 2023-10-02 NOTE — PROGRESS NOTES
[facility]-Med/Surg  Wound Care    Patient Name:  Lanie Abad   MRN:  [id number]  Date: 10/2/2023  Diagnosis: <principal problem not specified>    History:     Past Medical History:   Diagnosis Date    Blood clotting disorder     Breast cancer     Closed comminuted intertrochanteric fracture of femur     Colostomy status     Diabetes mellitus, type 2     History of deep vein thrombophlebitis of lower extremity        Social History     Socioeconomic History    Marital status:    Tobacco Use    Smoking status: Never    Smokeless tobacco: Never   Substance and Sexual Activity    Alcohol use: Never    Drug use: Never    Sexual activity: Not Currently       Precautions:     Allergies as of 10/01/2023 - Reviewed 10/01/2023   Allergen Reaction Noted    Pregabalin  02/08/2023       WOC Assessment Details/Treatment        10/02/23 0951   WOCN Assessment   WOCN Total Time (mins) 45   Visit Date 10/02/23   Visit Time 0900   Consult Type New   WOCN Speciality Wound   Wound pressure   Number of Wounds 2   Ostomy Type Colostomy   Intervention assessed;changed;applied   Teaching on-going   Skin Interventions   Pressure Reduction Devices pressure-redistributing mattress utilized   Pressure Reduction Techniques frequent weight shift encouraged   Positioning   Body Position position changed independently   Positioning/Transfer Devices pillows   Pressure Injury Prevention    Check Moisture Management Pad Done   Sacral Foam Dressing Remove        Altered Skin Integrity 10/01/23 1838 Left Buttocks #1 Partial thickness tissue loss. Shallow open ulcer with a red or pink wound bed, without slough. Intact or Open/Ruptured Serum-filled blister.   Date First Assessed/Time First Assessed: 10/01/23 1838   Altered Skin Integrity Present on Admission - Did Patient arrive to the hospital with altered skin?: yes  Side: Left  Location: Buttocks  Wound Number: #1  Is this injury device related?: No  De...   Wound Image      Description of Altered Skin Integrity Partial thickness tissue loss. Shallow open ulcer with a red or pink wound bed, without slough. Intact or Open/Ruptured Serum-filled blister.   Dressing Appearance Dry;Intact;Clean   Drainage Amount None   Appearance [place];Moist   Periwound Area Intact   Wound Edges Defined   Wound Length (cm) 1 cm   Wound Width (cm) 1.1 cm   Wound Depth (cm) 0.1 cm   Wound Volume (cm^3) 0.11 cm^3   Wound Surface Area (cm^2) 1.1 cm^2   Care Cleansed with:;Sterile normal saline;Applied:;Skin Barrier   Dressing Applied;Foam   Dressing Change Due 10/06/23        Altered Skin Integrity 10/02/23 0915 Coccyx Partial thickness tissue loss. Shallow open ulcer with a red or pink wound bed, without slough. Intact or Open/Ruptured Serum-filled blister.   Date First Assessed/Time First Assessed: 10/02/23 0915   Altered Skin Integrity Present on Admission - Did Patient arrive to the hospital with altered skin?: yes  Location: Coccyx  Description of Altered Skin Integrity: Partial thickness tissue loss. ...   Wound Image     Description of Altered Skin Integrity Partial thickness tissue loss. Shallow open ulcer with a red or pink wound bed, without slough. Intact or Open/Ruptured Serum-filled blister.   Dressing Appearance Dry;Intact;Clean   Drainage Amount None   Appearance White;Moist   Periwound Area Pale white   Wound Length (cm) 0.2 cm   Wound Width (cm) 0.2 cm   Wound Depth (cm) 0.1 cm   Wound Volume (cm^3) 0.004 cm^3   Wound Surface Area (cm^2) 0.04 cm^2   Care Cleansed with:;Sterile normal saline;Applied:;Skin Barrier   Dressing Applied;Foam   Dressing Change Due 10/06/23       Orders placed.     10/02/2023

## 2023-10-02 NOTE — PT/OT/SLP EVAL
Physical Therapy Evaluation    Patient Name:  Lanie Abad   MRN:  [id number]    Recommendations:     Discharge Recommendations: other (see comments) (TBD)   Discharge Equipment Recommendations: none   Barriers to discharge:  current medical status    Assessment:     Lanie Abad is a 89 y.o. female admitted with a medical diagnosis of Sepsis.  She presents with the following impairments/functional limitations: weakness, impaired endurance, impaired functional mobility, gait instability, impaired balance, decreased lower extremity function, decreased safety awareness     Patient tolerated supine to sit EOB with min/mod A then transferred to upright chair with mod A. Patient sat up for about 20 mins and performed some BLE therex with min cues. Patient then had accident and had to be transferred back to bed with mod A and returned to supine to be cleaned.     Rehab Prognosis: Good and Fair; patient would benefit from acute skilled PT services to address these deficits and reach maximum level of function.    Recent Surgery: * No surgery found *      Plan:     During this hospitalization, patient to be seen 5 x/week (5-6x weekly/1-2x daily) to address the identified rehab impairments via gait training, therapeutic activities, therapeutic exercises and progress toward the following goals:    Plan of Care Expires:  11/02/23    Subjective     Chief Complaint: weakness  Patient/Family Comments/goals: to get stronger to go home  Pain/Comfort:       Patients cultural, spiritual, Orthodox conflicts given the current situation:      Living Environment:  Lives at home in [facility] with son    Prior to admission, patients level of function was no walking, only transferred to and from w/c.  Equipment used at home: walker, standard.  DME owned (not currently used): none.  Upon discharge, patient will have assistance from family.    Objective:     Communicated with nursing prior to session.  Patient found HOB elevated with peripheral  IV, PureWick  upon PT entry to room.    General Precautions: Standard, fall  Orthopedic Precautions:N/A   Braces: N/A  Respiratory Status: Room air    Exams:  RLE Strength: grossly 3+/5  LLE Strength: grossly 3+/5    Functional Mobility:  Bed Mobility:     Supine to Sit: minimum assistance and moderate assistance  Sit to Supine: minimum assistance and moderate assistance  Transfers:     Sit to Stand:  moderate assistance with hand-held assist  Bed to Chair: moderate assistance with  hand-held assist  using  Squat Pivot      AM-PAC 6 CLICK MOBILITY  Total Score:        Treatment & Education:  See above.    Patient left HOB elevated with all lines intact, call button in reach, bed alarm on, and nursing students and nurse present.    GOALS:   Multidisciplinary Problems       Physical Therapy Goals          Problem: Physical Therapy    Goal Priority Disciplines Outcome Goal Variances Interventions   Physical Therapy Goal     PT, PT/OT Ongoing, Progressing     Description: Goals to be met by: discharge     Patient will increase functional independence with mobility by performin. Supine to sit with Contact Guard Assistance  2. Sit to stand transfer with Contact Guard Assistance  3. Bed to chair transfer with Contact Guard Assistance using Rolling Walker                         History:     Past Medical History:   Diagnosis Date    Blood clotting disorder     Breast cancer     Closed comminuted intertrochanteric fracture of femur     Colostomy status     Diabetes mellitus, type 2     History of deep vein thrombophlebitis of lower extremity        Past Surgical History:   Procedure Laterality Date    BLADDER SUSPENSION      CHOLECYSTECTOMY      HYSTERECTOMY      INSERTION OF INTRAMEDULLARY ROSA ELENA Right 2018    femur    MASTECTOMY Right        Time Tracking:     PT Received On: 10/02/23  PT Start Time: 1255     PT Stop Time: 1340  PT Total Time (min): 45 min     Billable Minutes: Evaluation 15, Therapeutic Activity  15, and Therapeutic Exercise 15      10/02/2023

## 2023-10-02 NOTE — PLAN OF CARE
Problem: Physical Therapy  Goal: Physical Therapy Goal  Description: Goals to be met by: discharge     Patient will increase functional independence with mobility by performin. Supine to sit with Contact Guard Assistance  2. Sit to stand transfer with Contact Guard Assistance  3. Bed to chair transfer with Contact Guard Assistance using Rolling Walker    Outcome: Ongoing, Progressing

## 2023-10-02 NOTE — ASSESSMENT & PLAN NOTE
"This patient does have evidence of infective focus  My overall impression is sepsis.  Source: Urinary Tract  Antibiotics given-   Antibiotics (72h ago, onward)    Start     Stop Route Frequency Ordered    10/01/23 2100  ciprofloxacin-hydrocortisone 0.2-1% otic suspension 3 drop         -- LEFT EAR 2 times daily 10/01/23 1712    10/01/23 1815  cefTRIAXone (ROCEPHIN) 1 g in dextrose 5 % in water (D5W) 100 mL IVPB (MB+)         -- IV Every 24 hours (non-standard times) 10/01/23 1712        Latest lactate reviewed-  No results for input(s): "LACTATE" in the last 72 hours.  Organ dysfunction indicated by Encephalopathy    Fluid challenge Actual Body weight- Patient will receive 30ml/kg actual body weight to calculate fluid bolus for treatment of septic shock.     Post- resuscitation assessment No - Post resuscitation assessment not needed       Will Not start Pressors- Levophed for MAP of 65  Source control achieved by: contiue iv abx  "

## 2023-10-02 NOTE — HOSPITAL COURSE
10/02/2023 pt admitted with frequent falls at home.  Lives with her son, she is a very poor historian, denies any pain complaints due to falls.    10/3/2023 urine culture gram neg betsey > 100,000 on iv rocephin  Sodium 124 today   Move to chair with PT today   Case mgmt spoke with family today interested in SNF placement for therapy  10/4/2023 sodium still low today   Urine culture E coli sens to rocephin  Working with PT   10/5/2023   Feel better today   Lab improving  Case work on SNF  Awaiting PT   10/6/2023 no new c/o   Feel better today   10/7/2023 lab improving  Awaiting PT  10/8/2023 no c/o awaiting placement

## 2023-10-02 NOTE — ASSESSMENT & PLAN NOTE
Will consult PT  Case management to discuss with family, may consider SNF level of care post d/c

## 2023-10-02 NOTE — PLAN OF CARE
Problem: Adult Inpatient Plan of Care  Goal: Plan of Care Review  Outcome: Ongoing, Progressing  Goal: Patient-Specific Goal (Individualized)  Outcome: Ongoing, Progressing  Goal: Absence of Hospital-Acquired Illness or Injury  Outcome: Ongoing, Progressing  Goal: Optimal Comfort and Wellbeing  Outcome: Ongoing, Progressing  Goal: Readiness for Transition of Care  Outcome: Ongoing, Progressing     Problem: Diabetes Comorbidity  Goal: Blood Glucose Level Within Targeted Range  Outcome: Ongoing, Progressing     Problem: Skin Injury Risk Increased  Goal: Skin Health and Integrity  Outcome: Ongoing, Progressing     Problem: Impaired Wound Healing  Goal: Optimal Wound Healing  Outcome: Ongoing, Progressing     Problem: Fall Injury Risk  Goal: Absence of Fall and Fall-Related Injury  Outcome: Ongoing, Progressing     Problem: Adjustment to Illness (Sepsis/Septic Shock)  Goal: Optimal Coping  Outcome: Ongoing, Progressing     Problem: Bleeding (Sepsis/Septic Shock)  Goal: Absence of Bleeding  Outcome: Ongoing, Progressing     Problem: Glycemic Control Impaired (Sepsis/Septic Shock)  Goal: Blood Glucose Level Within Desired Range  Outcome: Ongoing, Progressing     Problem: Infection Progression (Sepsis/Septic Shock)  Goal: Absence of Infection Signs and Symptoms  Outcome: Ongoing, Progressing     Problem: Nutrition Impaired (Sepsis/Septic Shock)  Goal: Optimal Nutrition Intake  Outcome: Ongoing, Progressing

## 2023-10-02 NOTE — SUBJECTIVE & OBJECTIVE
Past Medical History:   Diagnosis Date    Blood clotting disorder     Breast cancer     Closed comminuted intertrochanteric fracture of femur     Colostomy status     Diabetes mellitus, type 2     History of deep vein thrombophlebitis of lower extremity        Past Surgical History:   Procedure Laterality Date    BLADDER SUSPENSION      CHOLECYSTECTOMY      HYSTERECTOMY      INSERTION OF INTRAMEDULLARY ROSA ELENA Right 2018    femur    MASTECTOMY Right        Review of patient's allergies indicates:   Allergen Reactions    Pregabalin      Other reaction(s): unknown       No current facility-administered medications on file prior to encounter.     Current Outpatient Medications on File Prior to Encounter   Medication Sig    ciprofloxacin-dexAMETHasone 0.3-0.1% (CIPRODEX) 0.3-0.1 % DrpS Place 4 drops into both ears 2 (two) times daily.    TRESIBA FLEXTOUCH U-100 100 unit/mL (3 mL) insulin pen SMARTSI Unit(s) SUB-Q Every Evening    liraglutide 0.6 mg/0.1 mL, 18 mg/3 mL, subq PNIJ (VICTOZA 2-RADHA) 0.6 mg/0.1 mL (18 mg/3 mL) PnIj pen Inject 1.8 mg into the skin once daily.    [DISCONTINUED] OZEMPIC 0.25 mg or 0.5 mg(2 mg/1.5 mL) pen injector Inject 2 mg into the skin every 7 days.    [DISCONTINUED] OZEMPIC 1 mg/dose (4 mg/3 mL) Inject 1 mg into the skin every 7 days.    [DISCONTINUED] semaglutide (OZEMPIC) 0.25 mg or 0.5 mg(2 mg/1.5 mL) pen injector INJECT 1MG (2 SHOTS OF 0.5MG) SUBQ WEEKLY AS DIRECTED - ROTATE INJECTION SITES    [DISCONTINUED] semaglutide (OZEMPIC) 1 mg/dose (4 mg/3 mL) Inject 1 mg into the skin.     Family History    None       Tobacco Use    Smoking status: Never    Smokeless tobacco: Never   Substance and Sexual Activity    Alcohol use: Never    Drug use: Never    Sexual activity: Not Currently     Review of Systems   Constitutional: Negative.    HENT:  Positive for ear pain.    Eyes: Negative.    Respiratory: Negative.     Cardiovascular: Negative.    Gastrointestinal: Negative.    Endocrine:  Negative.    Genitourinary:  Positive for dysuria and frequency.   Musculoskeletal: Negative.    Skin: Negative.    Allergic/Immunologic: Negative.    Neurological:  Positive for weakness.   Hematological: Negative.    Psychiatric/Behavioral: Negative.       Objective:     Vital Signs (Most Recent):  Temp: 98.3 °F (36.8 °C) (10/02/23 1039)  Pulse: 88 (10/02/23 1039)  Resp: 18 (10/02/23 1039)  BP: 136/76 (10/02/23 1039)  SpO2: 96 % (10/02/23 1039) Vital Signs (24h Range):  Temp:  [98.3 °F (36.8 °C)-99.5 °F (37.5 °C)] 98.3 °F (36.8 °C)  Pulse:  [] 88  Resp:  [2-20] 18  SpO2:  [93 %-99 %] 96 %  BP: (103-137)/(55-80) 136/76     Weight: 72.1 kg (159 lb)  Body mass index is 29.08 kg/m².     Physical Exam  Constitutional:       Appearance: Normal appearance. She is obese.   HENT:      Head: Normocephalic and atraumatic.      Mouth/Throat:      Pharynx: Oropharynx is clear.   Eyes:      Extraocular Movements: Extraocular movements intact.      Conjunctiva/sclera: Conjunctivae normal.      Pupils: Pupils are equal, round, and reactive to light.   Cardiovascular:      Rate and Rhythm: Regular rhythm. Tachycardia present.   Pulmonary:      Effort: Pulmonary effort is normal.      Breath sounds: Normal breath sounds.   Abdominal:      General: Abdomen is flat. Bowel sounds are normal.      Palpations: Abdomen is soft.      Comments: Colostomy in place   Musculoskeletal:         General: Normal range of motion.      Cervical back: Normal range of motion and neck supple.   Skin:     General: Skin is warm and dry.   Neurological:      General: No focal deficit present.      Mental Status: She is alert and oriented to person, place, and time. Mental status is at baseline.      Comments: Speech is difficult to understand with slurring, left side lower facial droop   Psychiatric:         Mood and Affect: Mood normal.              CRANIAL NERVES     CN III, IV, VI   Pupils are equal, round, and reactive to light.      "  Significant Labs: All pertinent labs within the past 24 hours have been reviewed.  A1C:   Recent Labs   Lab 09/11/23  1006   HGBA1C 8.8*       ABGs: No results for input(s): "PH", "PCO2", "HCO3", "POCSATURATED", "BE", "TOTALHB", "COHB", "METHB", "O2HB", "POCFIO2", "PO2" in the last 48 hours.  Bilirubin:   Recent Labs   Lab 10/01/23  1545 10/02/23  0428   BILITOT 1.4* 1.1*       Blood Culture: No results for input(s): "LABBLOO" in the last 48 hours.  BMP:   Recent Labs   Lab 10/02/23  0428   *   K 4.3   CO2 16*   BUN 14.0   CREATININE 0.51*   CALCIUM 8.2*       CBC:   Recent Labs   Lab 10/01/23  1545 10/02/23  0428   WBC 13.84* 11.99*   HGB 14.2 13.5   HCT 40.7 38.6    193       CMP:   Recent Labs   Lab 10/01/23  1545 10/02/23  0428   * 123*   K 4.5 4.3   CO2 22 16*   BUN 14.0 14.0   CREATININE 0.61* 0.51*   CALCIUM 9.1 8.2*   ALBUMIN 3.8 3.3*   BILITOT 1.4* 1.1*   ALKPHOS 211* 177*   AST 26 28   ALT 20 17       Cardiac Markers: No results for input(s): "CKMB", "MYOGLOBIN", "BNP", "TROPISTAT" in the last 48 hours.  Coagulation: No results for input(s): "PT", "INR", "APTT" in the last 48 hours.  Lactic Acid: No results for input(s): "LACTATE" in the last 48 hours.  Lipase: No results for input(s): "LIPASE" in the last 48 hours.  Lipid Panel: No results for input(s): "CHOL", "HDL", "LDLCALC", "TRIG", "CHOLHDL" in the last 48 hours.  Magnesium: No results for input(s): "MG" in the last 48 hours.  Pathology Results  (Last 10 years)      None          POCT Glucose:   Recent Labs   Lab 10/02/23  0220 10/02/23  0716 10/02/23  1044   POCTGLUCOSE 290* 282* 357*       Prealbumin: No results for input(s): "PREALBUMIN" in the last 48 hours.  Respiratory Culture: No results for input(s): "GSRESP", "RESPIRATORYC" in the last 48 hours.  Troponin:   Recent Labs   Lab 10/01/23  1545   TROPONINI <0.012       TSH: No results for input(s): "TSH" in the last 4320 hours.  Urine Culture: No results for input(s): " ""LABURIN" in the last 48 hours.  Urine Studies:   Recent Labs   Lab 10/01/23  1643   APPEARANCEUA Cloudy*   PROTEINUA Trace*   BILIRUBINUA Negative   UROBILINOGEN 1.0   LEUKOCYTESUR Small*   RBCUA 3-5   WBCUA 11-20*   BACTERIA 4+*         Significant Imaging:    CXR  Left sided atelectasis  " Alert

## 2023-10-03 LAB
ANION GAP SERPL CALC-SCNC: 7 MEQ/L (ref 2–13)
BASOPHILS # BLD AUTO: 0.03 X10(3)/MCL (ref 0.01–0.08)
BASOPHILS NFR BLD AUTO: 0.3 % (ref 0.1–1.2)
BUN SERPL-MCNC: 11 MG/DL (ref 7–20)
CALCIUM SERPL-MCNC: 8.5 MG/DL (ref 8.4–10.2)
CHLORIDE SERPL-SCNC: 94 MMOL/L (ref 98–110)
CO2 SERPL-SCNC: 23 MMOL/L (ref 21–32)
CREAT SERPL-MCNC: 0.55 MG/DL (ref 0.66–1.25)
CREAT/UREA NIT SERPL: 20 (ref 12–20)
EOSINOPHIL # BLD AUTO: 0 X10(3)/MCL (ref 0.04–0.36)
EOSINOPHIL NFR BLD AUTO: 0 % (ref 0.7–7)
ERYTHROCYTE [DISTWIDTH] IN BLOOD BY AUTOMATED COUNT: 13.5 % (ref 11–14.5)
GFR SERPLBLD CREATININE-BSD FMLA CKD-EPI: 87 MLS/MIN/1.73/M2
GLUCOSE SERPL-MCNC: 230 MG/DL (ref 70–115)
HCT VFR BLD AUTO: 38 % (ref 36–48)
HGB BLD-MCNC: 13.3 G/DL (ref 11.8–16)
IMM GRANULOCYTES # BLD AUTO: 0.05 X10(3)/MCL (ref 0–0.03)
IMM GRANULOCYTES NFR BLD AUTO: 0.5 % (ref 0–0.5)
LYMPHOCYTES # BLD AUTO: 0.77 X10(3)/MCL (ref 1.16–3.74)
LYMPHOCYTES NFR BLD AUTO: 7.9 % (ref 20–55)
MCH RBC QN AUTO: 31.4 PG (ref 27–34)
MCHC RBC AUTO-ENTMCNC: 35 G/DL (ref 31–37)
MCV RBC AUTO: 89.8 FL (ref 79–99)
MONOCYTES # BLD AUTO: 1.47 X10(3)/MCL (ref 0.24–0.36)
MONOCYTES NFR BLD AUTO: 15.1 % (ref 4.7–12.5)
NEUTROPHILS # BLD AUTO: 7.39 X10(3)/MCL (ref 1.56–6.13)
NEUTROPHILS NFR BLD AUTO: 76.2 % (ref 37–73)
NRBC BLD AUTO-RTO: 0 %
PLATELET # BLD AUTO: 166 X10(3)/MCL (ref 140–371)
PMV BLD AUTO: 8.5 FL (ref 9.4–12.4)
POCT GLUCOSE: 215 MG/DL (ref 70–110)
POCT GLUCOSE: 253 MG/DL (ref 70–110)
POCT GLUCOSE: 256 MG/DL (ref 70–110)
POCT GLUCOSE: 290 MG/DL (ref 70–110)
POTASSIUM SERPL-SCNC: 4 MMOL/L (ref 3.5–5.1)
RBC # BLD AUTO: 4.23 X10(6)/MCL (ref 4–5.1)
SODIUM SERPL-SCNC: 124 MMOL/L (ref 135–145)
WBC # SPEC AUTO: 9.71 X10(3)/MCL (ref 4–11.5)

## 2023-10-03 PROCEDURE — 97110 THERAPEUTIC EXERCISES: CPT

## 2023-10-03 PROCEDURE — 85025 COMPLETE CBC W/AUTO DIFF WBC: CPT | Performed by: FAMILY MEDICINE

## 2023-10-03 PROCEDURE — 63600175 PHARM REV CODE 636 W HCPCS: Performed by: INTERNAL MEDICINE

## 2023-10-03 PROCEDURE — 25000003 PHARM REV CODE 250: Performed by: FAMILY MEDICINE

## 2023-10-03 PROCEDURE — 80048 BASIC METABOLIC PNL TOTAL CA: CPT | Performed by: FAMILY MEDICINE

## 2023-10-03 PROCEDURE — 97530 THERAPEUTIC ACTIVITIES: CPT

## 2023-10-03 PROCEDURE — 63600175 PHARM REV CODE 636 W HCPCS: Performed by: FAMILY MEDICINE

## 2023-10-03 PROCEDURE — 11000001 HC ACUTE MED/SURG PRIVATE ROOM

## 2023-10-03 PROCEDURE — 36415 COLL VENOUS BLD VENIPUNCTURE: CPT | Performed by: FAMILY MEDICINE

## 2023-10-03 PROCEDURE — 94761 N-INVAS EAR/PLS OXIMETRY MLT: CPT

## 2023-10-03 RX ADMIN — CEFEPIME 1 G: 1 INJECTION, POWDER, FOR SOLUTION INTRAMUSCULAR; INTRAVENOUS at 05:10

## 2023-10-03 RX ADMIN — CIPROFLOXACIN HYDROCHLORIDE AND HYDROCORTISONE 3 DROP: 2; 10 SUSPENSION/ DROPS AURICULAR (OTIC) at 08:10

## 2023-10-03 RX ADMIN — ENOXAPARIN SODIUM 40 MG: 40 INJECTION SUBCUTANEOUS at 05:10

## 2023-10-03 RX ADMIN — INSULIN DETEMIR 14 UNITS: 100 INJECTION, SOLUTION SUBCUTANEOUS at 08:10

## 2023-10-03 RX ADMIN — INSULIN ASPART 2 UNITS: 100 INJECTION, SOLUTION INTRAVENOUS; SUBCUTANEOUS at 07:10

## 2023-10-03 RX ADMIN — CIPROFLOXACIN HYDROCHLORIDE AND HYDROCORTISONE 3 DROP: 2; 10 SUSPENSION/ DROPS AURICULAR (OTIC) at 09:10

## 2023-10-03 RX ADMIN — INSULIN ASPART 3 UNITS: 100 INJECTION, SOLUTION INTRAVENOUS; SUBCUTANEOUS at 11:10

## 2023-10-03 RX ADMIN — CEFEPIME 1 G: 1 INJECTION, POWDER, FOR SOLUTION INTRAMUSCULAR; INTRAVENOUS at 10:10

## 2023-10-03 RX ADMIN — INSULIN ASPART 3 UNITS: 100 INJECTION, SOLUTION INTRAVENOUS; SUBCUTANEOUS at 05:10

## 2023-10-03 NOTE — SUBJECTIVE & OBJECTIVE
Past Medical History:   Diagnosis Date    Blood clotting disorder     Breast cancer     Closed comminuted intertrochanteric fracture of femur     Colostomy status     Diabetes mellitus, type 2     History of deep vein thrombophlebitis of lower extremity        Past Surgical History:   Procedure Laterality Date    BLADDER SUSPENSION      CHOLECYSTECTOMY      HYSTERECTOMY      INSERTION OF INTRAMEDULLARY ROSA ELENA Right 2018    femur    MASTECTOMY Right        Review of patient's allergies indicates:   Allergen Reactions    Pregabalin      Other reaction(s): unknown       No current facility-administered medications on file prior to encounter.     Current Outpatient Medications on File Prior to Encounter   Medication Sig    ciprofloxacin-dexAMETHasone 0.3-0.1% (CIPRODEX) 0.3-0.1 % DrpS Place 4 drops into both ears 2 (two) times daily.    TRESIBA FLEXTOUCH U-100 100 unit/mL (3 mL) insulin pen SMARTSI Unit(s) SUB-Q Every Evening    liraglutide 0.6 mg/0.1 mL, 18 mg/3 mL, subq PNIJ (VICTOZA 2-RADHA) 0.6 mg/0.1 mL (18 mg/3 mL) PnIj pen Inject 1.8 mg into the skin once daily.    [DISCONTINUED] OZEMPIC 0.25 mg or 0.5 mg(2 mg/1.5 mL) pen injector Inject 2 mg into the skin every 7 days.    [DISCONTINUED] OZEMPIC 1 mg/dose (4 mg/3 mL) Inject 1 mg into the skin every 7 days.    [DISCONTINUED] semaglutide (OZEMPIC) 0.25 mg or 0.5 mg(2 mg/1.5 mL) pen injector INJECT 1MG (2 SHOTS OF 0.5MG) SUBQ WEEKLY AS DIRECTED - ROTATE INJECTION SITES    [DISCONTINUED] semaglutide (OZEMPIC) 1 mg/dose (4 mg/3 mL) Inject 1 mg into the skin.     Family History    None       Tobacco Use    Smoking status: Never    Smokeless tobacco: Never   Substance and Sexual Activity    Alcohol use: Never    Drug use: Never    Sexual activity: Not Currently     Review of Systems   Constitutional: Negative.    HENT:  Positive for ear pain.    Eyes: Negative.    Respiratory: Negative.     Cardiovascular: Negative.    Gastrointestinal: Negative.    Endocrine:  Negative.    Genitourinary:  Positive for dysuria and frequency.   Musculoskeletal: Negative.    Skin: Negative.    Allergic/Immunologic: Negative.    Neurological:  Positive for weakness.   Hematological: Negative.    Psychiatric/Behavioral: Negative.       Objective:     Vital Signs (Most Recent):  Temp: 99.2 °F (37.3 °C) (10/03/23 0727)  Pulse: 85 (10/03/23 0728)  Resp: 16 (10/03/23 0728)  BP: 126/66 (10/03/23 0727)  SpO2: (!) 92 % (10/03/23 0728) Vital Signs (24h Range):  Temp:  [97.4 °F (36.3 °C)-100.5 °F (38.1 °C)] 99.2 °F (37.3 °C)  Pulse:  [83-96] 85  Resp:  [16-20] 16  SpO2:  [92 %-94 %] 92 %  BP: (124-140)/(66-78) 126/66     Weight: 71.8 kg (158 lb 4.8 oz)  Body mass index is 28.95 kg/m².     Physical Exam  Constitutional:       Appearance: Normal appearance. She is obese.   HENT:      Head: Normocephalic and atraumatic.      Mouth/Throat:      Pharynx: Oropharynx is clear.   Eyes:      Extraocular Movements: Extraocular movements intact.      Conjunctiva/sclera: Conjunctivae normal.      Pupils: Pupils are equal, round, and reactive to light.   Cardiovascular:      Rate and Rhythm: Regular rhythm. Tachycardia present.   Pulmonary:      Effort: Pulmonary effort is normal.      Breath sounds: Normal breath sounds.   Abdominal:      General: Abdomen is flat. Bowel sounds are normal.      Palpations: Abdomen is soft.      Comments: Colostomy in place   Musculoskeletal:         General: Normal range of motion.      Cervical back: Normal range of motion and neck supple.   Skin:     General: Skin is warm and dry.   Neurological:      General: No focal deficit present.      Mental Status: She is alert and oriented to person, place, and time. Mental status is at baseline.      Comments: Speech is difficult to understand with slurring, left side lower facial droop   Psychiatric:         Mood and Affect: Mood normal.              CRANIAL NERVES     CN III, IV, VI   Pupils are equal, round, and reactive to light.      "  Significant Labs: All pertinent labs within the past 24 hours have been reviewed.  A1C:   Recent Labs   Lab 09/11/23  1006   HGBA1C 8.8*       ABGs: No results for input(s): "PH", "PCO2", "HCO3", "POCSATURATED", "BE", "TOTALHB", "COHB", "METHB", "O2HB", "POCFIO2", "PO2" in the last 48 hours.  Bilirubin:   Recent Labs   Lab 10/01/23  1545 10/02/23  0428   BILITOT 1.4* 1.1*       Blood Culture: No results for input(s): "LABBLOO" in the last 48 hours.  BMP:   Recent Labs   Lab 10/03/23  0739   *   K 4.0   CO2 23   BUN 11.0   CREATININE 0.55*   CALCIUM 8.5       CBC:   Recent Labs   Lab 10/01/23  1545 10/02/23  0428 10/03/23  0739   WBC 13.84* 11.99* 9.71   HGB 14.2 13.5 13.3   HCT 40.7 38.6 38.0    193 166       CMP:   Recent Labs   Lab 10/01/23  1545 10/02/23  0428 10/03/23  0739   * 123* 124*   K 4.5 4.3 4.0   CO2 22 16* 23   BUN 14.0 14.0 11.0   CREATININE 0.61* 0.51* 0.55*   CALCIUM 9.1 8.2* 8.5   ALBUMIN 3.8 3.3*  --    BILITOT 1.4* 1.1*  --    ALKPHOS 211* 177*  --    AST 26 28  --    ALT 20 17  --        Cardiac Markers: No results for input(s): "CKMB", "MYOGLOBIN", "BNP", "TROPISTAT" in the last 48 hours.  Coagulation: No results for input(s): "PT", "INR", "APTT" in the last 48 hours.  Lactic Acid: No results for input(s): "LACTATE" in the last 48 hours.  Lipase: No results for input(s): "LIPASE" in the last 48 hours.  Lipid Panel: No results for input(s): "CHOL", "HDL", "LDLCALC", "TRIG", "CHOLHDL" in the last 48 hours.  Magnesium: No results for input(s): "MG" in the last 48 hours.  Pathology Results  (Last 10 years)      None          POCT Glucose:   Recent Labs   Lab 10/02/23  1649 10/02/23  2022 10/03/23  0705   POCTGLUCOSE 373* 284* 215*       Prealbumin: No results for input(s): "PREALBUMIN" in the last 48 hours.  Respiratory Culture: No results for input(s): "GSRESP", "RESPIRATORYC" in the last 48 hours.  Troponin:   Recent Labs   Lab 10/01/23  1545   TROPONINI <0.012       TSH: No " "results for input(s): "TSH" in the last 4320 hours.  Urine Culture:   Recent Labs   Lab 10/01/23  1643   LABURIN >/= 100,000 colonies/ml Gram-negative Rods*     Urine Studies:   Recent Labs   Lab 10/01/23  1643   APPEARANCEUA Cloudy*   PROTEINUA Trace*   BILIRUBINUA Negative   UROBILINOGEN 1.0   LEUKOCYTESUR Small*   RBCUA 3-5   WBCUA 11-20*   BACTERIA 4+*         Significant Imaging:    CXR  Left sided atelectasis  "

## 2023-10-03 NOTE — PLAN OF CARE
10/03/23 0958   Discharge Assessment   Assessment Type Discharge Planning Assessment   Confirmed/corrected address, phone number and insurance Yes   Confirmed Demographics Correct on Facesheet   Source of Information family   When was your last doctors appointment?   (does not remember)   Communicated MIKE with patient/caregiver Date not available/Unable to determine   Reason For Admission UTI   People in Home alone   Facility Arrived From: home   Do you expect to return to your current living situation? Yes   Do you have help at home or someone to help you manage your care at home? No   Prior to hospitilization cognitive status: Unable to Assess   Current cognitive status: Alert/Oriented   Walking or Climbing Stairs ambulation difficulty, requires equipment   Mobility Management wheelchair   Do you have any problems with: Needs other help   Specify other help neighbors help   Home Accessibility wheelchair accessible   Home Layout Able to live on 1st floor   Equipment Currently Used at Home wheelchair;other (see comments)  (colostomy supplies)   Readmission within 30 days? No   Patient currently being followed by outpatient case management? No   Do you currently have service(s) that help you manage your care at home? No   Do you take prescription medications? Yes   Do you have prescription coverage? Yes   Coverage MCR   Do you have any problems affording any of your prescribed medications? No   Is the patient taking medications as prescribed? yes   Who is going to help you get home at discharge? family   How do you get to doctors appointments? family or friend will provide   Are you on dialysis? No   Do you take coumadin? No   DME Needed Upon Discharge  none   Discharge Plan discussed with: Patient;Adult children   Transition of Care Barriers None   Discharge Plan A Skilled Nursing Facility   Discharge Plan B Rehab   Physical Activity   On average, how many days per week do you engage in moderate to strenuous  exercise (like a brisk walk)? 0 days   On average, how many minutes do you engage in exercise at this level? 0 min   Financial Resource Strain   How hard is it for you to pay for the very basics like food, housing, medical care, and heating? Not hard   Housing Stability   In the last 12 months, was there a time when you were not able to pay the mortgage or rent on time? N   In the last 12 months, how many places have you lived? 1   In the last 12 months, was there a time when you did not have a steady place to sleep or slept in a shelter (including now)? N   Transportation Needs   In the past 12 months, has lack of transportation kept you from medical appointments or from getting medications? no   In the past 12 months, has lack of transportation kept you from meetings, work, or from getting things needed for daily living? No   Food Insecurity   Within the past 12 months, you worried that your food would run out before you got the money to buy more. Never true   Within the past 12 months, the food you bought just didn't last and you didn't have money to get more. Never true   Stress   Do you feel stress - tense, restless, nervous, or anxious, or unable to sleep at night because your mind is troubled all the time - these days? Not at all   Social Connections   In a typical week, how many times do you talk on the phone with family, friends, or neighbors? More than 3   How often do you get together with friends or relatives? Twice   How often do you attend Scientologist or Orthodoxy services? Never   Do you belong to any clubs or organizations such as Scientologist groups, unions, fraternal or athletic groups, or school groups? No   How often do you attend meetings of the clubs or organizations you belong to? Never   Are you , , , , never , or living with a partner?    Alcohol Use   Q1: How often do you have a drink containing alcohol? Never   Q2: How many drinks containing alcohol do you  have on a typical day when you are drinking? None   Q3: How often do you have six or more drinks on one occasion? Never

## 2023-10-03 NOTE — PT/OT/SLP PROGRESS
Physical Therapy Treatment    Patient Name:  Lanie bAad   MRN:  [id number]    Recommendations:     Discharge Recommendations: other (see comments) (TBD)  Discharge Equipment Recommendations: none  Barriers to discharge: None    Assessment:     Lanie Abad is a 89 y.o. female admitted with a medical diagnosis of Sepsis.  She presents with the following impairments/functional limitations: weakness, impaired endurance, impaired functional mobility, gait instability, impaired balance, decreased lower extremity function, decreased safety awareness     Patient tolerated supine to sit transfer to sit EOB and then transferred to upright chair to sit up and perform exercises. Patient performed BLE therex with min cues to continue. Patient sat up for about 20 minutes before requesting to lay back down. Patient transferred back to bed with mod A with max safety cues.     Rehab Prognosis: Good and Fair; patient would benefit from acute skilled PT services to address these deficits and reach maximum level of function.    Recent Surgery: * No surgery found *      Plan:     During this hospitalization, patient to be seen 5 x/week (5-6x weekly/1-2x daily) to address the identified rehab impairments via gait training, therapeutic activities, therapeutic exercises and progress toward the following goals:    Plan of Care Expires:  11/02/23    Subjective     Chief Complaint: weakness  Patient/Family Comments/goals: to get stronger to go home alone  Pain/Comfort:         Objective:     Communicated with nursing prior to session.  Patient found HOB elevated with peripheral IV, PureWick upon PT entry to room.     General Precautions: Standard, fall  Orthopedic Precautions: N/A  Braces: N/A  Respiratory Status: Room air     Functional Mobility:  Bed Mobility:     Supine to Sit: minimum assistance and moderate assistance  Sit to Supine: minimum assistance and moderate assistance  Transfers:     Sit to Stand:  minimum assistance and  moderate assistance with no AD  Bed to Chair: moderate assistance with  no AD  using  Squat Pivot      AM-PAC 6 CLICK MOBILITY          Treatment & Education:  See above.     Patient left HOB elevated with all lines intact, call button in reach, and bed alarm on..    GOALS:   Multidisciplinary Problems       Physical Therapy Goals          Problem: Physical Therapy    Goal Priority Disciplines Outcome Goal Variances Interventions   Physical Therapy Goal     PT, PT/OT Ongoing, Progressing     Description: Goals to be met by: discharge     Patient will increase functional independence with mobility by performin. Supine to sit with Contact Guard Assistance  2. Sit to stand transfer with Contact Guard Assistance  3. Bed to chair transfer with Contact Guard Assistance using Rolling Walker                         Time Tracking:     PT Received On: 10/03/23  PT Start Time: 0945     PT Stop Time: 1015  PT Total Time (min): 30 min     Billable Minutes: Therapeutic Activity 20 and Therapeutic Exercise 10    Treatment Type: Treatment  PT/PTA: PT           10/03/2023

## 2023-10-03 NOTE — PT/OT/SLP PROGRESS
Physical Therapy Treatment    Patient Name:  Lanie Abad   MRN:  [id number]    Recommendations:     Discharge Recommendations: other (see comments) (TBD)  Discharge Equipment Recommendations: none  Barriers to discharge: None    Assessment:     Lanie Abad is a 89 y.o. female admitted with a medical diagnosis of Sepsis.  She presents with the following impairments/functional limitations: weakness, impaired endurance, impaired functional mobility, gait instability, impaired balance, decreased lower extremity function, decreased safety awareness     Patient tolerated supine to sit transfer to sit EOB and then transferred to upright chair to sit up and perform exercises. Patient performed BLE therex with min cues to continue. Patient sat up for about 20 minutes before requesting to lay back down. Patient transferred back to bed with mod A with max safety cues.     Rehab Prognosis: Good and Fair; patient would benefit from acute skilled PT services to address these deficits and reach maximum level of function.    Recent Surgery: * No surgery found *      Plan:     During this hospitalization, patient to be seen 5 x/week (5-6x weekly/1-2x daily) to address the identified rehab impairments via gait training, therapeutic activities, therapeutic exercises and progress toward the following goals:    Plan of Care Expires:  11/02/23    Subjective     Chief Complaint: weakness  Patient/Family Comments/goals: to get stronger to go home alone  Pain/Comfort:         Objective:     Communicated with nursing prior to session.  Patient found HOB elevated with peripheral IV, PureWick upon PT entry to room.     General Precautions: Standard, fall  Orthopedic Precautions: N/A  Braces: N/A  Respiratory Status: Room air     Functional Mobility:  Bed Mobility:     Supine to Sit: minimum assistance and moderate assistance  Sit to Supine: minimum assistance and moderate assistance  Transfers:     Sit to Stand:  minimum assistance and  moderate assistance with no AD  Bed to Chair: moderate assistance with  no AD  using  Squat Pivot      AM-PAC 6 CLICK MOBILITY          Treatment & Education:  See above.     Patient left HOB elevated with all lines intact, call button in reach, and bed alarm on..    GOALS:   Multidisciplinary Problems       Physical Therapy Goals          Problem: Physical Therapy    Goal Priority Disciplines Outcome Goal Variances Interventions   Physical Therapy Goal     PT, PT/OT Ongoing, Progressing     Description: Goals to be met by: discharge     Patient will increase functional independence with mobility by performin. Supine to sit with Contact Guard Assistance  2. Sit to stand transfer with Contact Guard Assistance  3. Bed to chair transfer with Contact Guard Assistance using Rolling Walker                         Time Tracking:     PT Received On: 10/03/23  PT Start Time: 1300     PT Stop Time: 1330  PT Total Time (min): 30 min     Billable Minutes: Therapeutic Activity 20 and Therapeutic Exercise 10    Treatment Type: Treatment  PT/PTA: PT           10/03/2023

## 2023-10-03 NOTE — PROGRESS NOTES
[facility]/Surg  [place] [facility]  Progress Note    Patient Name: Lanie Abad  MRN: [id number]  Patient Class: IP- Inpatient   Admission Date: 10/1/2023  Length of Stay: 2 days  Attending Physician: Garima Lindo MD  Primary Care Provider: Kaycee Esparza FNP-C        Subjective:     Principal Problem:Sepsis        HPI:  89 yo female with hx IDDM, Colostomy, Rt mastectomy due to breast CA, Recent Left er infection lives alone gets around with wheelchair presents with a fall in the bathroom couldn't get up. She had another fall 4 days ago after her bathroom chair chair broke. Denies Chest pain, SOB, productive cough, N/V/D, has dysuria, and frequency. Accompanied by her children daughter and son      Overview/Hospital Course:  10/02/2023 pt admitted with frequent falls at home.  Lives with her son, she is a very poor historian, denies any pain complaints due to falls.    10/3/2023 urine culture gram neg rosa elena > 100,000 on iv rocephin  Sodium 124 today   Move to chair with PT today   Case mgmt spoke with family today interested in SNF placement for therapy      Past Medical History:   Diagnosis Date    Blood clotting disorder     Breast cancer     Closed comminuted intertrochanteric fracture of femur     Colostomy status     Diabetes mellitus, type 2     History of deep vein thrombophlebitis of lower extremity        Past Surgical History:   Procedure Laterality Date    BLADDER SUSPENSION      CHOLECYSTECTOMY      HYSTERECTOMY      INSERTION OF INTRAMEDULLARY ROSA ELENA Right 08/03/2018    femur    MASTECTOMY Right        Review of patient's allergies indicates:   Allergen Reactions    Pregabalin      Other reaction(s): unknown       No current facility-administered medications on file prior to encounter.     Current Outpatient Medications on File Prior to Encounter   Medication Sig    ciprofloxacin-dexAMETHasone 0.3-0.1% (CIPRODEX) 0.3-0.1 % DrpS Place 4 drops into both ears 2 (two)  times daily.    TRESIBA FLEXTOUCH U-100 100 unit/mL (3 mL) insulin pen SMARTSI Unit(s) SUB-Q Every Evening    liraglutide 0.6 mg/0.1 mL, 18 mg/3 mL, subq PNIJ (VICTOZA 2-RADHA) 0.6 mg/0.1 mL (18 mg/3 mL) PnIj pen Inject 1.8 mg into the skin once daily.    [DISCONTINUED] OZEMPIC 0.25 mg or 0.5 mg(2 mg/1.5 mL) pen injector Inject 2 mg into the skin every 7 days.    [DISCONTINUED] OZEMPIC 1 mg/dose (4 mg/3 mL) Inject 1 mg into the skin every 7 days.    [DISCONTINUED] semaglutide (OZEMPIC) 0.25 mg or 0.5 mg(2 mg/1.5 mL) pen injector INJECT 1MG (2 SHOTS OF 0.5MG) SUBQ WEEKLY AS DIRECTED - ROTATE INJECTION SITES    [DISCONTINUED] semaglutide (OZEMPIC) 1 mg/dose (4 mg/3 mL) Inject 1 mg into the skin.     Family History    None       Tobacco Use    Smoking status: Never    Smokeless tobacco: Never   Substance and Sexual Activity    Alcohol use: Never    Drug use: Never    Sexual activity: Not Currently     Review of Systems   Constitutional: Negative.    HENT:  Positive for ear pain.    Eyes: Negative.    Respiratory: Negative.     Cardiovascular: Negative.    Gastrointestinal: Negative.    Endocrine: Negative.    Genitourinary:  Positive for dysuria and frequency.   Musculoskeletal: Negative.    Skin: Negative.    Allergic/Immunologic: Negative.    Neurological:  Positive for weakness.   Hematological: Negative.    Psychiatric/Behavioral: Negative.       Objective:     Vital Signs (Most Recent):  Temp: 99.2 °F (37.3 °C) (10/03/23 0727)  Pulse: 85 (10/03/23 0728)  Resp: 16 (10/03/23 0728)  BP: 126/66 (10/03/23 0727)  SpO2: (!) 92 % (10/03/23 0728) Vital Signs (24h Range):  Temp:  [97.4 °F (36.3 °C)-100.5 °F (38.1 °C)] 99.2 °F (37.3 °C)  Pulse:  [83-96] 85  Resp:  [16-20] 16  SpO2:  [92 %-94 %] 92 %  BP: (124-140)/(66-78) 126/66     Weight: 71.8 kg (158 lb 4.8 oz)  Body mass index is 28.95 kg/m².     Physical Exam  Constitutional:       Appearance: Normal appearance. She is obese.   HENT:      Head:  "Normocephalic and atraumatic.      Mouth/Throat:      Pharynx: Oropharynx is clear.   Eyes:      Extraocular Movements: Extraocular movements intact.      Conjunctiva/sclera: Conjunctivae normal.      Pupils: Pupils are equal, round, and reactive to light.   Cardiovascular:      Rate and Rhythm: Regular rhythm. Tachycardia present.   Pulmonary:      Effort: Pulmonary effort is normal.      Breath sounds: Normal breath sounds.   Abdominal:      General: Abdomen is flat. Bowel sounds are normal.      Palpations: Abdomen is soft.      Comments: Colostomy in place   Musculoskeletal:         General: Normal range of motion.      Cervical back: Normal range of motion and neck supple.   Skin:     General: Skin is warm and dry.   Neurological:      General: No focal deficit present.      Mental Status: She is alert and oriented to person, place, and time. Mental status is at baseline.      Comments: Speech is difficult to understand with slurring, left side lower facial droop   Psychiatric:         Mood and Affect: Mood normal.              CRANIAL NERVES     CN III, IV, VI   Pupils are equal, round, and reactive to light.       Significant Labs: All pertinent labs within the past 24 hours have been reviewed.  A1C:   Recent Labs   Lab 09/11/23  1006   HGBA1C 8.8*       ABGs: No results for input(s): "PH", "PCO2", "HCO3", "POCSATURATED", "BE", "TOTALHB", "COHB", "METHB", "O2HB", "POCFIO2", "PO2" in the last 48 hours.  Bilirubin:   Recent Labs   Lab 10/01/23  1545 10/02/23  0428   BILITOT 1.4* 1.1*       Blood Culture: No results for input(s): "LABBLOO" in the last 48 hours.  BMP:   Recent Labs   Lab 10/03/23  0739   *   K 4.0   CO2 23   BUN 11.0   CREATININE 0.55*   CALCIUM 8.5       CBC:   Recent Labs   Lab 10/01/23  1545 10/02/23  0428 10/03/23  0739   WBC 13.84* 11.99* 9.71   HGB 14.2 13.5 13.3   HCT 40.7 38.6 38.0    193 166       CMP:   Recent Labs   Lab 10/01/23  1545 10/02/23  0428 10/03/23  0739   NA " "126* 123* 124*   K 4.5 4.3 4.0   CO2 22 16* 23   BUN 14.0 14.0 11.0   CREATININE 0.61* 0.51* 0.55*   CALCIUM 9.1 8.2* 8.5   ALBUMIN 3.8 3.3*  --    BILITOT 1.4* 1.1*  --    ALKPHOS 211* 177*  --    AST 26 28  --    ALT 20 17  --        Cardiac Markers: No results for input(s): "CKMB", "MYOGLOBIN", "BNP", "TROPISTAT" in the last 48 hours.  Coagulation: No results for input(s): "PT", "INR", "APTT" in the last 48 hours.  Lactic Acid: No results for input(s): "LACTATE" in the last 48 hours.  Lipase: No results for input(s): "LIPASE" in the last 48 hours.  Lipid Panel: No results for input(s): "CHOL", "HDL", "LDLCALC", "TRIG", "CHOLHDL" in the last 48 hours.  Magnesium: No results for input(s): "MG" in the last 48 hours.  Pathology Results  (Last 10 years)      None          POCT Glucose:   Recent Labs   Lab 10/02/23  1649 10/02/23  2022 10/03/23  0705   POCTGLUCOSE 373* 284* 215*       Prealbumin: No results for input(s): "PREALBUMIN" in the last 48 hours.  Respiratory Culture: No results for input(s): "GSRESP", "RESPIRATORYC" in the last 48 hours.  Troponin:   Recent Labs   Lab 10/01/23  1545   TROPONINI <0.012       TSH: No results for input(s): "TSH" in the last 4320 hours.  Urine Culture:   Recent Labs   Lab 10/01/23  1643   LABURIN >/= 100,000 colonies/ml Gram-negative Rods*     Urine Studies:   Recent Labs   Lab 10/01/23  1643   APPEARANCEUA Cloudy*   PROTEINUA Trace*   BILIRUBINUA Negative   UROBILINOGEN 1.0   LEUKOCYTESUR Small*   RBCUA 3-5   WBCUA 11-20*   BACTERIA 4+*         Significant Imaging:    CXR  Left sided atelectasis      Assessment/Plan:      * Sepsis  This patient does have evidence of infective focus  My overall impression is sepsis.  Source: Urinary Tract  Antibiotics given-   Antibiotics (72h ago, onward)    Start     Stop Route Frequency Ordered    10/01/23 2100  ciprofloxacin-hydrocortisone 0.2-1% otic suspension 3 drop         -- LEFT EAR 2 times daily 10/01/23 1712    10/01/23 1815  " "cefTRIAXone (ROCEPHIN) 1 g in dextrose 5 % in water (D5W) 100 mL IVPB (MB+)         -- IV Every 24 hours (non-standard times) 10/01/23 1712        Latest lactate reviewed-  No results for input(s): "LACTATE" in the last 72 hours.  Organ dysfunction indicated by Encephalopathy    Fluid challenge Actual Body weight- Patient will receive 30ml/kg actual body weight to calculate fluid bolus for treatment of septic shock.     Post- resuscitation assessment No - Post resuscitation assessment not needed       Will Not start Pressors- Levophed for MAP of 65  Source control achieved by: contiue iv abx    Decubitus ulcer of coccyx, stage 2  POA  Consult wound care      Falls frequently  Will consult PT  Case management to discuss with family, may consider SNF level of care post d/c      UTI (urinary tract infection)  Continue iv abx  F/u on cx  Gram negative betsey > 100,000  Iv rocephin     Uncontrolled diabetes mellitus with hyperglycemia, with long-term current use of insulin  accuchecks  Increase detemir to 20U      Blood sugar better today 215 this AM         VTE Risk Mitigation (From admission, onward)         Ordered     enoxaparin injection 40 mg  Every 24 hours         10/01/23 1740     IP VTE HIGH RISK PATIENT  Once         10/01/23 1712     Place sequential compression device  Until discontinued         10/01/23 1712                Discharge Planning   MIKE:      Code Status: Full Code   Is the patient medically ready for discharge?:     Reason for patient still in hospital (select all that apply): Patient trending condition, Laboratory test and Treatment  Discharge Plan A: Skilled Nursing Facility                  Mark Waite MD  Department of Hospital Medicine   [facility]  "

## 2023-10-03 NOTE — PLAN OF CARE
Physician ordered to consult for SNF placement. Pt's family preference : [facility]. Referral made to [facility] per phone/fax.

## 2023-10-03 NOTE — CONSULTS
Inpatient Nutrition Evaluation    Admit Date: 10/1/2023   Total duration of encounter: 2 days    Nutrition Recommendation/Prescription     Change diet to 1800 ADA, Low Residue to lower blood sugar.     Consider Boost Glucose Control 1x/day if PO intake <75% (190 kcal, 16 gm pro).    Add Arginaid BID (30 kcal each) to help facilitate wound healing.     Dietitian will monitor Energy Intake, Food and Beverage Intake, Gastrointestinal Profile, Glucose/Endocrine Profile, Weight, Weight Change, and Wound Healing and adjust MNT as needed.       Monitoring & Evaluation     Communication of Recommendations: reviewed with nurse, reviewed with patient, and reviewed with family    Reason Seen: physician consult for diet education.     Nutrition Risk/Follow-Up: Low (follow-up in 5-7 days)  Patients assigned 'Low nutrition risk' status do not qualify for a full nutritional assessment but will be monitored and re-evaluated in a 5-7 day time period. Please consult if re-evaluation needed sooner.    Nutrition Assessment     Chart Review    Malnutrition Screening Tool Results   Have you recently lost weight without trying?: No  Have you been eating poorly because of a decreased appetite?: No   MST Score: 0     Diagnosis:  Sepsis, Decubitus ulcer-coccyn Stage 2, Frequent falls, uncontrolled T2DM.    Past Medical History:   Diagnosis Date    Blood clotting disorder     Breast cancer     Closed comminuted intertrochanteric fracture of femur     Colostomy status     Diabetes mellitus, type 2     History of deep vein thrombophlebitis of lower extremity      Past Surgical History:   Procedure Laterality Date    BLADDER SUSPENSION      CHOLECYSTECTOMY      HYSTERECTOMY      INSERTION OF INTRAMEDULLARY ROSA ELENA Right 08/03/2018    femur    MASTECTOMY Right        Nutrition-Related Medications: Maxipime, Insulin.     Nutrition-Related Labs:    Lab Results   Component Value Date     (L) 10/03/2023     08/02/2018     Lab Results  "  Component Value Date    CHLORIDE 94 (L) 10/03/2023    CHLORIDE 105 08/02/2018     Lab Results   Component Value Date    CREATININE 0.55 (L) 10/03/2023    CREATININE 0.68 08/02/2018     Lab Results   Component Value Date    EGFRNORACEVR 87 10/03/2023    EGFRNORACEVR 88 09/11/2023     Lab Results   Component Value Date    GLUCOSE 230 (H) 10/03/2023    GLUCOSE 170 (H) 08/02/2018     Lab Results   Component Value Date    POCTGLUCOSE 290 (H) 10/03/2023    POCTGLUCOSE 215 (H) 10/03/2023    POCTGLUCOSE 284 (H) 10/02/2023      Lab Results   Component Value Date    PHOS 1.3 (L) 08/07/2018    PHOS 1.6 (L) 08/04/2018     Lab Results   Component Value Date    ALKPHOS 177 (H) 10/02/2023    ALKPHOS 124 08/02/2018     Lab Results   Component Value Date    ALBUMIN 3.3 (L) 10/02/2023    ALBUMIN 3.60 08/02/2018     Lab Results   Component Value Date    BILITOT 1.1 (H) 10/02/2023    BILITOT 0.4 08/02/2018     CrCl:    Lab Value: 63.1    Date: 10/3    Diet Order: Diet diabetic  Oral Supplement Order: none  Appetite/Oral Intake: good/% of meals 88%-2 Meals.   Factors Affecting Nutritional Intake: altered gastrointestinal function  Food/Scientologist/Cultural Preferences: none reported  Food Allergies: none reported and no known food allergies    Skin Integrity: bruised (ecchymotic), wound (BRUISING AND WOUND TO RT BUTTOCK)  Wound(s):      Altered Skin Integrity 10/01/23 1838 Left Buttocks #1 Partial thickness tissue loss. Shallow open ulcer with a red or pink wound bed, without slough. Intact or Open/Ruptured Serum-filled blister.-Tissue loss description: Partial thickness     Overview/Hospital Course:    (10/3): Patient reports good appetite but does not eat some food d/t colostomy. Patient reports weight gain likely d/t change in DM medication. Per EMR family is interested in SNF. NKFA.    Anthropometrics    Height: 5' 2" (157.5 cm) Height Method: Stated  Last Weight: 71.8 kg (158 lb 4.8 oz) (10/02/23 1944) Weight Method: Bed " Scale  BMI (Calculated): 28.9  BMI Classification: overweight (BMI 25-29.9)     Ideal Body Weight (IBW), Female: 110 lb     % Ideal Body Weight, Female (lb): 144.55 %                             Usual Weight Provided By: EMR weight history and patient denies unintentional weight loss    Wt Readings from Last 5 Encounters:   10/02/23 71.8 kg (158 lb 4.8 oz)   09/19/23 69.9 kg (154 lb)   01/05/23 64.9 kg (142 lb 15.9 oz)   11/06/18 73 kg (161 lb)     Weight Change(s) Since Admission:  Admit Weight: 71.7 kg (158 lb) (10/01/23 1422)      Patient Education    Nutrition Education:    Patient educated on: Diabetic Diet.      Previously Education: No.    Teaching Method:  Printed Materials    Diabetes education not appropriate at this time. Patient was left with an educational handout that had RD's name and phone number attached.       Thank you for the consult.

## 2023-10-03 NOTE — PLAN OF CARE
Spoke with patient's son New and daughter Darling regarding DC plan. They are in agreement with SNF placement and would like her to go to [facility]

## 2023-10-04 PROBLEM — E87.1 HYPONATREMIA: Status: ACTIVE | Noted: 2023-10-04

## 2023-10-04 LAB
ANION GAP SERPL CALC-SCNC: 7 MEQ/L (ref 2–13)
BACTERIA UR CULT: ABNORMAL
BASOPHILS # BLD AUTO: 0.02 X10(3)/MCL (ref 0.01–0.08)
BASOPHILS NFR BLD AUTO: 0.2 % (ref 0.1–1.2)
BUN SERPL-MCNC: 16 MG/DL (ref 7–20)
CALCIUM SERPL-MCNC: 8.3 MG/DL (ref 8.4–10.2)
CHLORIDE SERPL-SCNC: 95 MMOL/L (ref 98–110)
CO2 SERPL-SCNC: 22 MMOL/L (ref 21–32)
CREAT SERPL-MCNC: 0.51 MG/DL (ref 0.66–1.25)
CREAT/UREA NIT SERPL: 31 (ref 12–20)
EOSINOPHIL # BLD AUTO: 0.02 X10(3)/MCL (ref 0.04–0.36)
EOSINOPHIL NFR BLD AUTO: 0.2 % (ref 0.7–7)
ERYTHROCYTE [DISTWIDTH] IN BLOOD BY AUTOMATED COUNT: 13.2 % (ref 11–14.5)
GFR SERPLBLD CREATININE-BSD FMLA CKD-EPI: 89 MLS/MIN/1.73/M2
GLUCOSE SERPL-MCNC: 216 MG/DL (ref 70–115)
HCT VFR BLD AUTO: 37.5 % (ref 36–48)
HGB BLD-MCNC: 13 G/DL (ref 11.8–16)
IMM GRANULOCYTES # BLD AUTO: 0.03 X10(3)/MCL (ref 0–0.03)
IMM GRANULOCYTES NFR BLD AUTO: 0.4 % (ref 0–0.5)
LYMPHOCYTES # BLD AUTO: 0.96 X10(3)/MCL (ref 1.16–3.74)
LYMPHOCYTES NFR BLD AUTO: 11.5 % (ref 20–55)
MAGNESIUM SERPL-MCNC: 1.9 MG/DL (ref 1.8–2.4)
MCH RBC QN AUTO: 31 PG (ref 27–34)
MCHC RBC AUTO-ENTMCNC: 34.7 G/DL (ref 31–37)
MCV RBC AUTO: 89.3 FL (ref 79–99)
MONOCYTES # BLD AUTO: 1.53 X10(3)/MCL (ref 0.24–0.36)
MONOCYTES NFR BLD AUTO: 18.4 % (ref 4.7–12.5)
NEUTROPHILS # BLD AUTO: 5.76 X10(3)/MCL (ref 1.56–6.13)
NEUTROPHILS NFR BLD AUTO: 69.3 % (ref 37–73)
NRBC BLD AUTO-RTO: 0 %
PLATELET # BLD AUTO: 183 X10(3)/MCL (ref 140–371)
PMV BLD AUTO: 8.7 FL (ref 9.4–12.4)
POCT GLUCOSE: 194 MG/DL (ref 70–110)
POCT GLUCOSE: 199 MG/DL (ref 70–110)
POCT GLUCOSE: 207 MG/DL (ref 70–110)
POCT GLUCOSE: 263 MG/DL (ref 70–110)
POCT GLUCOSE: 326 MG/DL (ref 70–110)
POCT GLUCOSE: 351 MG/DL (ref 70–110)
POTASSIUM SERPL-SCNC: 3.8 MMOL/L (ref 3.5–5.1)
RBC # BLD AUTO: 4.2 X10(6)/MCL (ref 4–5.1)
SODIUM SERPL-SCNC: 124 MMOL/L (ref 135–145)
WBC # SPEC AUTO: 8.32 X10(3)/MCL (ref 4–11.5)

## 2023-10-04 PROCEDURE — 11000001 HC ACUTE MED/SURG PRIVATE ROOM

## 2023-10-04 PROCEDURE — 25000003 PHARM REV CODE 250: Performed by: FAMILY MEDICINE

## 2023-10-04 PROCEDURE — 63600175 PHARM REV CODE 636 W HCPCS: Performed by: FAMILY MEDICINE

## 2023-10-04 PROCEDURE — 83735 ASSAY OF MAGNESIUM: CPT | Performed by: FAMILY MEDICINE

## 2023-10-04 PROCEDURE — 63600175 PHARM REV CODE 636 W HCPCS: Performed by: INTERNAL MEDICINE

## 2023-10-04 PROCEDURE — 97110 THERAPEUTIC EXERCISES: CPT

## 2023-10-04 PROCEDURE — 85025 COMPLETE CBC W/AUTO DIFF WBC: CPT | Performed by: FAMILY MEDICINE

## 2023-10-04 PROCEDURE — 97530 THERAPEUTIC ACTIVITIES: CPT

## 2023-10-04 PROCEDURE — 94761 N-INVAS EAR/PLS OXIMETRY MLT: CPT

## 2023-10-04 PROCEDURE — 36415 COLL VENOUS BLD VENIPUNCTURE: CPT | Performed by: FAMILY MEDICINE

## 2023-10-04 PROCEDURE — 80048 BASIC METABOLIC PNL TOTAL CA: CPT | Performed by: FAMILY MEDICINE

## 2023-10-04 RX ORDER — SODIUM CHLORIDE 1 G/1
1000 TABLET ORAL 2 TIMES DAILY
Status: DISCONTINUED | OUTPATIENT
Start: 2023-10-04 | End: 2023-10-06

## 2023-10-04 RX ADMIN — CEFTRIAXONE 2 G: 2 INJECTION, POWDER, FOR SOLUTION INTRAMUSCULAR; INTRAVENOUS at 09:10

## 2023-10-04 RX ADMIN — INSULIN ASPART 3 UNITS: 100 INJECTION, SOLUTION INTRAVENOUS; SUBCUTANEOUS at 04:10

## 2023-10-04 RX ADMIN — Medication 6 MG: at 08:10

## 2023-10-04 RX ADMIN — INSULIN ASPART 5 UNITS: 100 INJECTION, SOLUTION INTRAVENOUS; SUBCUTANEOUS at 11:10

## 2023-10-04 RX ADMIN — CEFEPIME 1 G: 1 INJECTION, POWDER, FOR SOLUTION INTRAMUSCULAR; INTRAVENOUS at 02:10

## 2023-10-04 RX ADMIN — SODIUM CHLORIDE 1000 MG: 1 TABLET ORAL at 08:10

## 2023-10-04 RX ADMIN — ENOXAPARIN SODIUM 40 MG: 40 INJECTION SUBCUTANEOUS at 04:10

## 2023-10-04 RX ADMIN — CIPROFLOXACIN HYDROCHLORIDE AND HYDROCORTISONE 3 DROP: 2; 10 SUSPENSION/ DROPS AURICULAR (OTIC) at 08:10

## 2023-10-04 RX ADMIN — INSULIN ASPART 2 UNITS: 100 INJECTION, SOLUTION INTRAVENOUS; SUBCUTANEOUS at 08:10

## 2023-10-04 RX ADMIN — SODIUM CHLORIDE 1000 MG: 1 TABLET ORAL at 11:10

## 2023-10-04 RX ADMIN — CIPROFLOXACIN HYDROCHLORIDE AND HYDROCORTISONE 3 DROP: 2; 10 SUSPENSION/ DROPS AURICULAR (OTIC) at 09:10

## 2023-10-04 RX ADMIN — ACETAMINOPHEN 650 MG: 325 TABLET, FILM COATED ORAL at 08:10

## 2023-10-04 RX ADMIN — INSULIN DETEMIR 14 UNITS: 100 INJECTION, SOLUTION SUBCUTANEOUS at 09:10

## 2023-10-04 NOTE — PT/OT/SLP PROGRESS
Physical Therapy Treatment    Patient Name:  Lanie Abad   MRN:  [id number]    Recommendations:     Discharge Recommendations: other (see comments) (TBD)  Discharge Equipment Recommendations: none  Barriers to discharge: None    Assessment:     Lanie Abad is a 89 y.o. female admitted with a medical diagnosis of Sepsis.  She presents with the following impairments/functional limitations: weakness, impaired endurance, impaired functional mobility, gait instability, impaired balance, decreased lower extremity function, decreased safety awareness     Patient tolerated supine to sit transfer to sit EOB and then transferred to upright chair to sit up and perform exercises. Patient performed BLE therex with min cues to continue exercises while sitting up.  Patient later transferred back to bed by therapist with mod A     Rehab Prognosis: Good and Fair; patient would benefit from acute skilled PT services to address these deficits and reach maximum level of function.    Recent Surgery: * No surgery found *      Plan:     During this hospitalization, patient to be seen 5 x/week (5-6x weekly/1-2x daily) to address the identified rehab impairments via gait training, therapeutic activities, therapeutic exercises and progress toward the following goals:    Plan of Care Expires:  11/02/23    Subjective     Chief Complaint: weakness  Patient/Family Comments/goals: to get stronger to go home alone  Pain/Comfort:         Objective:     Communicated with nursing prior to session.  Patient found HOB elevated with peripheral IV, PureWick upon PT entry to room.     General Precautions: Standard, fall  Orthopedic Precautions: N/A  Braces: N/A  Respiratory Status: Room air     Functional Mobility:  Bed Mobility:     Supine to Sit: minimum assistance and moderate assistance  Sit to Supine: minimum assistance and moderate assistance  Transfers:     Sit to Stand:  minimum assistance and moderate assistance with no AD  Bed to Chair:  moderate assistance with  no AD  using  Squat Pivot      AM-PAC 6 CLICK MOBILITY          Treatment & Education:  See above.     Patient left up in chair with all lines intact and call button in reach..    GOALS:   Multidisciplinary Problems       Physical Therapy Goals          Problem: Physical Therapy    Goal Priority Disciplines Outcome Goal Variances Interventions   Physical Therapy Goal     PT, PT/OT Ongoing, Progressing     Description: Goals to be met by: discharge     Patient will increase functional independence with mobility by performin. Supine to sit with Contact Guard Assistance  2. Sit to stand transfer with Contact Guard Assistance  3. Bed to chair transfer with Contact Guard Assistance using Rolling Walker                         Time Tracking:     PT Received On: 10/04/23  PT Start Time: 1300     PT Stop Time: 1340  PT Total Time (min): 40 min     Billable Minutes: Therapeutic Activity 30 and Therapeutic Exercise 10    Treatment Type: Treatment  PT/PTA: PT           10/04/2023

## 2023-10-04 NOTE — SUBJECTIVE & OBJECTIVE
Past Medical History:   Diagnosis Date    Blood clotting disorder     Breast cancer     Closed comminuted intertrochanteric fracture of femur     Colostomy status     Diabetes mellitus, type 2     History of deep vein thrombophlebitis of lower extremity        Past Surgical History:   Procedure Laterality Date    BLADDER SUSPENSION      CHOLECYSTECTOMY      HYSTERECTOMY      INSERTION OF INTRAMEDULLARY ROSA ELENA Right 2018    femur    MASTECTOMY Right        Review of patient's allergies indicates:   Allergen Reactions    Pregabalin      Other reaction(s): unknown       No current facility-administered medications on file prior to encounter.     Current Outpatient Medications on File Prior to Encounter   Medication Sig    ciprofloxacin-dexAMETHasone 0.3-0.1% (CIPRODEX) 0.3-0.1 % DrpS Place 4 drops into both ears 2 (two) times daily.    TRESIBA FLEXTOUCH U-100 100 unit/mL (3 mL) insulin pen SMARTSI Unit(s) SUB-Q Every Evening    liraglutide 0.6 mg/0.1 mL, 18 mg/3 mL, subq PNIJ (VICTOZA 2-RADHA) 0.6 mg/0.1 mL (18 mg/3 mL) PnIj pen Inject 1.8 mg into the skin once daily.    [DISCONTINUED] OZEMPIC 0.25 mg or 0.5 mg(2 mg/1.5 mL) pen injector Inject 2 mg into the skin every 7 days.    [DISCONTINUED] OZEMPIC 1 mg/dose (4 mg/3 mL) Inject 1 mg into the skin every 7 days.    [DISCONTINUED] semaglutide (OZEMPIC) 0.25 mg or 0.5 mg(2 mg/1.5 mL) pen injector INJECT 1MG (2 SHOTS OF 0.5MG) SUBQ WEEKLY AS DIRECTED - ROTATE INJECTION SITES    [DISCONTINUED] semaglutide (OZEMPIC) 1 mg/dose (4 mg/3 mL) Inject 1 mg into the skin.     Family History    None       Tobacco Use    Smoking status: Never    Smokeless tobacco: Never   Substance and Sexual Activity    Alcohol use: Never    Drug use: Never    Sexual activity: Not Currently     Review of Systems   Constitutional: Negative.    HENT:  Positive for ear pain.    Eyes: Negative.    Respiratory: Negative.     Cardiovascular: Negative.    Gastrointestinal: Negative.    Endocrine:  Negative.    Genitourinary:  Positive for dysuria and frequency.   Musculoskeletal: Negative.    Skin: Negative.    Allergic/Immunologic: Negative.    Neurological:  Positive for weakness.   Hematological: Negative.    Psychiatric/Behavioral: Negative.       Objective:     Vital Signs (Most Recent):  Temp: 98.3 °F (36.8 °C) (10/04/23 0723)  Pulse: 80 (10/04/23 0728)  Resp: 18 (10/04/23 0728)  BP: 128/68 (10/04/23 0723)  SpO2: (!) 94 % (10/04/23 0728) Vital Signs (24h Range):  Temp:  [97.5 °F (36.4 °C)-99.5 °F (37.5 °C)] 98.3 °F (36.8 °C)  Pulse:  [80-91] 80  Resp:  [18-20] 18  SpO2:  [92 %-97 %] 94 %  BP: (107-144)/(61-79) 128/68     Weight: 71.5 kg (157 lb 11.2 oz)  Body mass index is 28.84 kg/m².     Physical Exam  Constitutional:       Appearance: Normal appearance. She is obese.   HENT:      Head: Normocephalic and atraumatic.      Mouth/Throat:      Pharynx: Oropharynx is clear.   Eyes:      Extraocular Movements: Extraocular movements intact.      Conjunctiva/sclera: Conjunctivae normal.      Pupils: Pupils are equal, round, and reactive to light.   Cardiovascular:      Rate and Rhythm: Regular rhythm. Tachycardia present.   Pulmonary:      Effort: Pulmonary effort is normal.      Breath sounds: Normal breath sounds.   Abdominal:      General: Abdomen is flat. Bowel sounds are normal.      Palpations: Abdomen is soft.      Comments: Colostomy in place   Musculoskeletal:         General: Normal range of motion.      Cervical back: Normal range of motion and neck supple.   Skin:     General: Skin is warm and dry.   Neurological:      General: No focal deficit present.      Mental Status: She is alert and oriented to person, place, and time. Mental status is at baseline.      Comments: Speech is difficult to understand with slurring, left side lower facial droop   Psychiatric:         Mood and Affect: Mood normal.              CRANIAL NERVES     CN III, IV, VI   Pupils are equal, round, and reactive to light.      "  Significant Labs: All pertinent labs within the past 24 hours have been reviewed.  A1C:   Recent Labs   Lab 09/11/23  1006   HGBA1C 8.8*       ABGs: No results for input(s): "PH", "PCO2", "HCO3", "POCSATURATED", "BE", "TOTALHB", "COHB", "METHB", "O2HB", "POCFIO2", "PO2" in the last 48 hours.  Bilirubin:   Recent Labs   Lab 10/01/23  1545 10/02/23  0428   BILITOT 1.4* 1.1*       Blood Culture: No results for input(s): "LABBLOO" in the last 48 hours.  BMP:   Recent Labs   Lab 10/04/23  0447   *   K 3.8   CO2 22   BUN 16.0   CREATININE 0.51*   CALCIUM 8.3*   MG 1.90       CBC:   Recent Labs   Lab 10/03/23  0739 10/04/23  0447   WBC 9.71 8.32   HGB 13.3 13.0   HCT 38.0 37.5    183       CMP:   Recent Labs   Lab 10/03/23  0739 10/04/23  0447   * 124*   K 4.0 3.8   CO2 23 22   BUN 11.0 16.0   CREATININE 0.55* 0.51*   CALCIUM 8.5 8.3*       Cardiac Markers: No results for input(s): "CKMB", "MYOGLOBIN", "BNP", "TROPISTAT" in the last 48 hours.  Coagulation: No results for input(s): "PT", "INR", "APTT" in the last 48 hours.  Lactic Acid: No results for input(s): "LACTATE" in the last 48 hours.  Lipase: No results for input(s): "LIPASE" in the last 48 hours.  Lipid Panel: No results for input(s): "CHOL", "HDL", "LDLCALC", "TRIG", "CHOLHDL" in the last 48 hours.  Magnesium:   Recent Labs   Lab 10/04/23  0447   MG 1.90     Pathology Results  (Last 10 years)      None          POCT Glucose:   Recent Labs   Lab 10/04/23  0011 10/04/23  0544 10/04/23  0714   POCTGLUCOSE 194* 199* 207*       Prealbumin: No results for input(s): "PREALBUMIN" in the last 48 hours.  Respiratory Culture: No results for input(s): "GSRESP", "RESPIRATORYC" in the last 48 hours.  Troponin:   No results for input(s): "TROPONINI", "TROPONINIHS" in the last 48 hours.    TSH: No results for input(s): "TSH" in the last 4320 hours.  Urine Culture: No results for input(s): "LABURIN" in the last 48 hours.    Urine Studies:   No results for " "input(s): "COLORU", "APPEARANCEUA", "PHUR", "SPECGRAV", "PROTEINUA", "GLUCUA", "KETONESU", "BILIRUBINUA", "OCCULTUA", "NITRITE", "UROBILINOGEN", "LEUKOCYTESUR", "RBCUA", "WBCUA", "BACTERIA", "SQUAMEPITHEL", "HYALINECASTS" in the last 48 hours.    Invalid input(s): "WRIGHTSUR"      Significant Imaging:    CXR  Left sided atelectasis  "

## 2023-10-04 NOTE — PLAN OF CARE
Problem: Adult Inpatient Plan of Care  Goal: Plan of Care Review  Outcome: Ongoing, Progressing  Goal: Patient-Specific Goal (Individualized)  Outcome: Ongoing, Progressing  Goal: Absence of Hospital-Acquired Illness or Injury  Outcome: Ongoing, Progressing  Goal: Optimal Comfort and Wellbeing  Outcome: Ongoing, Progressing  Goal: Readiness for Transition of Care  Outcome: Ongoing, Progressing     Problem: Diabetes Comorbidity  Goal: Blood Glucose Level Within Targeted Range  Outcome: Ongoing, Progressing     Problem: Fall Injury Risk  Goal: Absence of Fall and Fall-Related Injury  Outcome: Ongoing, Progressing     Problem: Adjustment to Illness (Sepsis/Septic Shock)  Goal: Optimal Coping  Outcome: Ongoing, Progressing     Problem: Bleeding (Sepsis/Septic Shock)  Goal: Absence of Bleeding  Outcome: Ongoing, Progressing     Problem: Glycemic Control Impaired (Sepsis/Septic Shock)  Goal: Blood Glucose Level Within Desired Range  Outcome: Ongoing, Progressing     Problem: Infection Progression (Sepsis/Septic Shock)  Goal: Absence of Infection Signs and Symptoms  Outcome: Ongoing, Progressing     Problem: Nutrition Impaired (Sepsis/Septic Shock)  Goal: Optimal Nutrition Intake  Outcome: Ongoing, Progressing

## 2023-10-04 NOTE — PROGRESS NOTES
[facility]/Surg  [facility] Medicine  Progress Note    Patient Name: Lanie Abad  MRN: [id number]  Patient Class: IP- Inpatient   Admission Date: 10/1/2023  Length of Stay: 3 days  Attending Physician: Garima Lindo MD  Primary Care Provider: Kaycee Esparza FNP-C        Subjective:     Principal Problem:Sepsis        HPI:  89 yo female with hx IDDM, Colostomy, Rt mastectomy due to breast CA, Recent Left er infection lives alone gets around with wheelchair presents with a fall in the bathroom couldn't get up. She had another fall 4 days ago after her bathroom chair chair broke. Denies Chest pain, SOB, productive cough, N/V/D, has dysuria, and frequency. Accompanied by her children daughter and son      Overview/Hospital Course:  10/02/2023 pt admitted with frequent falls at home.  Lives with her son, she is a very poor historian, denies any pain complaints due to falls.    10/3/2023 urine culture gram neg rosa elena > 100,000 on iv rocephin  Sodium 124 today   Move to chair with PT today   Case mgmt spoke with family today interested in SNF placement for therapy  10/4/2023 sodium still low today   Urine culture E coli sens to rocephin  Working with PT       Past Medical History:   Diagnosis Date    Blood clotting disorder     Breast cancer     Closed comminuted intertrochanteric fracture of femur     Colostomy status     Diabetes mellitus, type 2     History of deep vein thrombophlebitis of lower extremity        Past Surgical History:   Procedure Laterality Date    BLADDER SUSPENSION      CHOLECYSTECTOMY      HYSTERECTOMY      INSERTION OF INTRAMEDULLARY ROSA ELENA Right 08/03/2018    femur    MASTECTOMY Right        Review of patient's allergies indicates:   Allergen Reactions    Pregabalin      Other reaction(s): unknown       No current facility-administered medications on file prior to encounter.     Current Outpatient Medications on File Prior to Encounter   Medication Sig     ciprofloxacin-dexAMETHasone 0.3-0.1% (CIPRODEX) 0.3-0.1 % DrpS Place 4 drops into both ears 2 (two) times daily.    TRESIBA FLEXTOUCH U-100 100 unit/mL (3 mL) insulin pen SMARTSI Unit(s) SUB-Q Every Evening    liraglutide 0.6 mg/0.1 mL, 18 mg/3 mL, subq PNIJ (VICTOZA 2-RADHA) 0.6 mg/0.1 mL (18 mg/3 mL) PnIj pen Inject 1.8 mg into the skin once daily.    [DISCONTINUED] OZEMPIC 0.25 mg or 0.5 mg(2 mg/1.5 mL) pen injector Inject 2 mg into the skin every 7 days.    [DISCONTINUED] OZEMPIC 1 mg/dose (4 mg/3 mL) Inject 1 mg into the skin every 7 days.    [DISCONTINUED] semaglutide (OZEMPIC) 0.25 mg or 0.5 mg(2 mg/1.5 mL) pen injector INJECT 1MG (2 SHOTS OF 0.5MG) SUBQ WEEKLY AS DIRECTED - ROTATE INJECTION SITES    [DISCONTINUED] semaglutide (OZEMPIC) 1 mg/dose (4 mg/3 mL) Inject 1 mg into the skin.     Family History    None       Tobacco Use    Smoking status: Never    Smokeless tobacco: Never   Substance and Sexual Activity    Alcohol use: Never    Drug use: Never    Sexual activity: Not Currently     Review of Systems   Constitutional: Negative.    HENT:  Positive for ear pain.    Eyes: Negative.    Respiratory: Negative.     Cardiovascular: Negative.    Gastrointestinal: Negative.    Endocrine: Negative.    Genitourinary:  Positive for dysuria and frequency.   Musculoskeletal: Negative.    Skin: Negative.    Allergic/Immunologic: Negative.    Neurological:  Positive for weakness.   Hematological: Negative.    Psychiatric/Behavioral: Negative.       Objective:     Vital Signs (Most Recent):  Temp: 98.3 °F (36.8 °C) (10/04/23 0723)  Pulse: 80 (10/04/23 0728)  Resp: 18 (10/04/23 0728)  BP: 128/68 (10/04/23 0723)  SpO2: (!) 94 % (10/04/23 0728) Vital Signs (24h Range):  Temp:  [97.5 °F (36.4 °C)-99.5 °F (37.5 °C)] 98.3 °F (36.8 °C)  Pulse:  [80-91] 80  Resp:  [18-20] 18  SpO2:  [92 %-97 %] 94 %  BP: (107-144)/(61-79) 128/68     Weight: 71.5 kg (157 lb 11.2 oz)  Body mass index is 28.84 kg/m².     Physical  "Exam  Constitutional:       Appearance: Normal appearance. She is obese.   HENT:      Head: Normocephalic and atraumatic.      Mouth/Throat:      Pharynx: Oropharynx is clear.   Eyes:      Extraocular Movements: Extraocular movements intact.      Conjunctiva/sclera: Conjunctivae normal.      Pupils: Pupils are equal, round, and reactive to light.   Cardiovascular:      Rate and Rhythm: Regular rhythm. Tachycardia present.   Pulmonary:      Effort: Pulmonary effort is normal.      Breath sounds: Normal breath sounds.   Abdominal:      General: Abdomen is flat. Bowel sounds are normal.      Palpations: Abdomen is soft.      Comments: Colostomy in place   Musculoskeletal:         General: Normal range of motion.      Cervical back: Normal range of motion and neck supple.   Skin:     General: Skin is warm and dry.   Neurological:      General: No focal deficit present.      Mental Status: She is alert and oriented to person, place, and time. Mental status is at baseline.      Comments: Speech is difficult to understand with slurring, left side lower facial droop   Psychiatric:         Mood and Affect: Mood normal.              CRANIAL NERVES     CN III, IV, VI   Pupils are equal, round, and reactive to light.       Significant Labs: All pertinent labs within the past 24 hours have been reviewed.  A1C:   Recent Labs   Lab 09/11/23  1006   HGBA1C 8.8*       ABGs: No results for input(s): "PH", "PCO2", "HCO3", "POCSATURATED", "BE", "TOTALHB", "COHB", "METHB", "O2HB", "POCFIO2", "PO2" in the last 48 hours.  Bilirubin:   Recent Labs   Lab 10/01/23  1545 10/02/23  0428   BILITOT 1.4* 1.1*       Blood Culture: No results for input(s): "LABBLOO" in the last 48 hours.  BMP:   Recent Labs   Lab 10/04/23  0447   *   K 3.8   CO2 22   BUN 16.0   CREATININE 0.51*   CALCIUM 8.3*   MG 1.90       CBC:   Recent Labs   Lab 10/03/23  0739 10/04/23  0447   WBC 9.71 8.32   HGB 13.3 13.0   HCT 38.0 37.5    183       CMP:   Recent " "Labs   Lab 10/03/23  0739 10/04/23  0447   * 124*   K 4.0 3.8   CO2 23 22   BUN 11.0 16.0   CREATININE 0.55* 0.51*   CALCIUM 8.5 8.3*       Cardiac Markers: No results for input(s): "CKMB", "MYOGLOBIN", "BNP", "TROPISTAT" in the last 48 hours.  Coagulation: No results for input(s): "PT", "INR", "APTT" in the last 48 hours.  Lactic Acid: No results for input(s): "LACTATE" in the last 48 hours.  Lipase: No results for input(s): "LIPASE" in the last 48 hours.  Lipid Panel: No results for input(s): "CHOL", "HDL", "LDLCALC", "TRIG", "CHOLHDL" in the last 48 hours.  Magnesium:   Recent Labs   Lab 10/04/23  0447   MG 1.90     Pathology Results  (Last 10 years)      None          POCT Glucose:   Recent Labs   Lab 10/04/23  0011 10/04/23  0544 10/04/23  0714   POCTGLUCOSE 194* 199* 207*       Prealbumin: No results for input(s): "PREALBUMIN" in the last 48 hours.  Respiratory Culture: No results for input(s): "GSRESP", "RESPIRATORYC" in the last 48 hours.  Troponin:   No results for input(s): "TROPONINI", "TROPONINIHS" in the last 48 hours.    TSH: No results for input(s): "TSH" in the last 4320 hours.  Urine Culture: No results for input(s): "LABURIN" in the last 48 hours.    Urine Studies:   No results for input(s): "COLORU", "APPEARANCEUA", "PHUR", "SPECGRAV", "PROTEINUA", "GLUCUA", "KETONESU", "BILIRUBINUA", "OCCULTUA", "NITRITE", "UROBILINOGEN", "LEUKOCYTESUR", "RBCUA", "WBCUA", "BACTERIA", "SQUAMEPITHEL", "HYALINECASTS" in the last 48 hours.    Invalid input(s): "WRIGHTSUR"      Significant Imaging:    CXR  Left sided atelectasis      Assessment/Plan:      * Sepsis  This patient does have evidence of infective focus  My overall impression is sepsis.  Source: Urinary Tract  Antibiotics given-   Antibiotics (72h ago, onward)    Start     Stop Route Frequency Ordered    10/01/23 2100  ciprofloxacin-hydrocortisone 0.2-1% otic suspension 3 drop         -- LEFT EAR 2 times daily 10/01/23 1712    10/01/23 5389  " "cefTRIAXone (ROCEPHIN) 1 g in dextrose 5 % in water (D5W) 100 mL IVPB (MB+)         -- IV Every 24 hours (non-standard times) 10/01/23 1712        Latest lactate reviewed-  No results for input(s): "LACTATE" in the last 72 hours.  Organ dysfunction indicated by Encephalopathy    Fluid challenge Actual Body weight- Patient will receive 30ml/kg actual body weight to calculate fluid bolus for treatment of septic shock.     Post- resuscitation assessment No - Post resuscitation assessment not needed       Will Not start Pressors- Levophed for MAP of 65  Source control achieved by: contiue iv abx    Hyponatremia  Patient has hyponatremia which is uncontrolled,We will aim to correct the sodium by 4-6mEq in 24 hours. We will monitor sodium Daily. The hyponatremia is due to Dehydration/hypovolemia. We will treat the hyponatremia with Sodium tablets. The patient's sodium results have been reviewed and are listed below.  Recent Labs   Lab 10/04/23  0447   *   add salt tab BID today repeat level in AM     Decubitus ulcer of coccyx, stage 2  POA  Consult wound care      Falls frequently  Will consult PT  Case management to discuss with family, may consider SNF level of care post d/c      UTI (urinary tract infection)  Continue iv abx  F/u on cx  Gram negative betsey > 100,000  Continue Iv rocephin   E coli    Uncontrolled diabetes mellitus with hyperglycemia, with long-term current use of insulin  accuchecks  Increase detemir to 20U      Blood sugar better today 215 this AM         VTE Risk Mitigation (From admission, onward)         Ordered     enoxaparin injection 40 mg  Every 24 hours         10/01/23 1740     IP VTE HIGH RISK PATIENT  Once         10/01/23 1712                Discharge Planning   MIKE: 10/6/2023     Code Status: Full Code   Is the patient medically ready for discharge?:     Reason for patient still in hospital (select all that apply): Patient trending condition, Laboratory test and Treatment  Discharge " Plan A: Skilled Nursing Facility                  Mark Waite MD  Department of Hospital Medicine   [facility]/Surg

## 2023-10-04 NOTE — ASSESSMENT & PLAN NOTE
Patient has hyponatremia which is uncontrolled,We will aim to correct the sodium by 4-6mEq in 24 hours. We will monitor sodium Daily. The hyponatremia is due to Dehydration/hypovolemia. We will treat the hyponatremia with Sodium tablets. The patient's sodium results have been reviewed and are listed below.  Recent Labs   Lab 10/04/23  0447   *   add salt tab BID today repeat level in AM

## 2023-10-05 LAB
ANION GAP SERPL CALC-SCNC: 4 MEQ/L (ref 2–13)
BASOPHILS # BLD AUTO: 0.02 X10(3)/MCL (ref 0.01–0.08)
BASOPHILS NFR BLD AUTO: 0.3 % (ref 0.1–1.2)
BUN SERPL-MCNC: 15 MG/DL (ref 7–20)
CALCIUM SERPL-MCNC: 8.7 MG/DL (ref 8.4–10.2)
CHLORIDE SERPL-SCNC: 97 MMOL/L (ref 98–110)
CO2 SERPL-SCNC: 28 MMOL/L (ref 21–32)
CREAT SERPL-MCNC: 0.56 MG/DL (ref 0.66–1.25)
CREAT/UREA NIT SERPL: 27 (ref 12–20)
EOSINOPHIL # BLD AUTO: 0.05 X10(3)/MCL (ref 0.04–0.36)
EOSINOPHIL NFR BLD AUTO: 0.7 % (ref 0.7–7)
ERYTHROCYTE [DISTWIDTH] IN BLOOD BY AUTOMATED COUNT: 13.2 % (ref 11–14.5)
GFR SERPLBLD CREATININE-BSD FMLA CKD-EPI: 87 MLS/MIN/1.73/M2
GLUCOSE SERPL-MCNC: 235 MG/DL (ref 70–115)
HCT VFR BLD AUTO: 37.1 % (ref 36–48)
HGB BLD-MCNC: 13.1 G/DL (ref 11.8–16)
IMM GRANULOCYTES # BLD AUTO: 0.06 X10(3)/MCL (ref 0–0.03)
IMM GRANULOCYTES NFR BLD AUTO: 0.8 % (ref 0–0.5)
LYMPHOCYTES # BLD AUTO: 1.27 X10(3)/MCL (ref 1.16–3.74)
LYMPHOCYTES NFR BLD AUTO: 17.9 % (ref 20–55)
MAGNESIUM SERPL-MCNC: 2.1 MG/DL (ref 1.8–2.4)
MCH RBC QN AUTO: 31.5 PG (ref 27–34)
MCHC RBC AUTO-ENTMCNC: 35.3 G/DL (ref 31–37)
MCV RBC AUTO: 89.2 FL (ref 79–99)
MONOCYTES # BLD AUTO: 1.39 X10(3)/MCL (ref 0.24–0.36)
MONOCYTES NFR BLD AUTO: 19.6 % (ref 4.7–12.5)
NEUTROPHILS # BLD AUTO: 4.29 X10(3)/MCL (ref 1.56–6.13)
NEUTROPHILS NFR BLD AUTO: 60.7 % (ref 37–73)
NRBC BLD AUTO-RTO: 0 %
PLATELET # BLD AUTO: 214 X10(3)/MCL (ref 140–371)
PMV BLD AUTO: 9.2 FL (ref 9.4–12.4)
POCT GLUCOSE: 220 MG/DL (ref 70–110)
POCT GLUCOSE: 245 MG/DL (ref 70–110)
POCT GLUCOSE: 278 MG/DL (ref 70–110)
POCT GLUCOSE: 377 MG/DL (ref 70–110)
POTASSIUM SERPL-SCNC: 4 MMOL/L (ref 3.5–5.1)
RBC # BLD AUTO: 4.16 X10(6)/MCL (ref 4–5.1)
SODIUM SERPL-SCNC: 129 MMOL/L (ref 135–145)
WBC # SPEC AUTO: 7.08 X10(3)/MCL (ref 4–11.5)

## 2023-10-05 PROCEDURE — 25000003 PHARM REV CODE 250: Performed by: FAMILY MEDICINE

## 2023-10-05 PROCEDURE — 83735 ASSAY OF MAGNESIUM: CPT | Performed by: FAMILY MEDICINE

## 2023-10-05 PROCEDURE — 63600175 PHARM REV CODE 636 W HCPCS: Performed by: INTERNAL MEDICINE

## 2023-10-05 PROCEDURE — 80048 BASIC METABOLIC PNL TOTAL CA: CPT | Performed by: FAMILY MEDICINE

## 2023-10-05 PROCEDURE — 85025 COMPLETE CBC W/AUTO DIFF WBC: CPT | Performed by: FAMILY MEDICINE

## 2023-10-05 PROCEDURE — 11000001 HC ACUTE MED/SURG PRIVATE ROOM

## 2023-10-05 PROCEDURE — 94761 N-INVAS EAR/PLS OXIMETRY MLT: CPT

## 2023-10-05 PROCEDURE — 97530 THERAPEUTIC ACTIVITIES: CPT

## 2023-10-05 PROCEDURE — 97110 THERAPEUTIC EXERCISES: CPT

## 2023-10-05 PROCEDURE — 36415 COLL VENOUS BLD VENIPUNCTURE: CPT | Performed by: FAMILY MEDICINE

## 2023-10-05 PROCEDURE — 63600175 PHARM REV CODE 636 W HCPCS: Performed by: FAMILY MEDICINE

## 2023-10-05 RX ADMIN — INSULIN ASPART 1 UNITS: 100 INJECTION, SOLUTION INTRAVENOUS; SUBCUTANEOUS at 08:10

## 2023-10-05 RX ADMIN — Medication 6 MG: at 08:10

## 2023-10-05 RX ADMIN — ENOXAPARIN SODIUM 40 MG: 40 INJECTION SUBCUTANEOUS at 04:10

## 2023-10-05 RX ADMIN — CIPROFLOXACIN HYDROCHLORIDE AND HYDROCORTISONE 3 DROP: 2; 10 SUSPENSION/ DROPS AURICULAR (OTIC) at 08:10

## 2023-10-05 RX ADMIN — INSULIN DETEMIR 14 UNITS: 100 INJECTION, SOLUTION SUBCUTANEOUS at 09:10

## 2023-10-05 RX ADMIN — INSULIN ASPART 5 UNITS: 100 INJECTION, SOLUTION INTRAVENOUS; SUBCUTANEOUS at 12:10

## 2023-10-05 RX ADMIN — CEFTRIAXONE 2 G: 2 INJECTION, POWDER, FOR SOLUTION INTRAMUSCULAR; INTRAVENOUS at 10:10

## 2023-10-05 RX ADMIN — CIPROFLOXACIN HYDROCHLORIDE AND HYDROCORTISONE 3 DROP: 2; 10 SUSPENSION/ DROPS AURICULAR (OTIC) at 09:10

## 2023-10-05 RX ADMIN — SODIUM CHLORIDE 1000 MG: 1 TABLET ORAL at 08:10

## 2023-10-05 RX ADMIN — INSULIN ASPART 3 UNITS: 100 INJECTION, SOLUTION INTRAVENOUS; SUBCUTANEOUS at 04:10

## 2023-10-05 RX ADMIN — SODIUM CHLORIDE 1000 MG: 1 TABLET ORAL at 09:10

## 2023-10-05 RX ADMIN — INSULIN ASPART 2 UNITS: 100 INJECTION, SOLUTION INTRAVENOUS; SUBCUTANEOUS at 09:10

## 2023-10-05 NOTE — PROGRESS NOTES
[facility]/Surg  [facility] Medicine  Progress Note    Patient Name: Lanie Abad  MRN: [id number]  Patient Class: IP- Inpatient   Admission Date: 10/1/2023  Length of Stay: 4 days  Attending Physician: Garima Lindo MD  Primary Care Provider: Kaycee Esparza FNP-C        Subjective:     Principal Problem:Sepsis        HPI:  89 yo female with hx IDDM, Colostomy, Rt mastectomy due to breast CA, Recent Left er infection lives alone gets around with wheelchair presents with a fall in the bathroom couldn't get up. She had another fall 4 days ago after her bathroom chair chair broke. Denies Chest pain, SOB, productive cough, N/V/D, has dysuria, and frequency. Accompanied by her children daughter and son      Overview/Hospital Course:  10/02/2023 pt admitted with frequent falls at home.  Lives with her son, she is a very poor historian, denies any pain complaints due to falls.    10/3/2023 urine culture gram neg rosa elena > 100,000 on iv rocephin  Sodium 124 today   Move to chair with PT today   Case mgmt spoke with family today interested in SNF placement for therapy  10/4/2023 sodium still low today   Urine culture E coli sens to rocephin  Working with PT   10/5/2023   Feel better today   Lab improving  Case work on SNF  Awaiting PT       Past Medical History:   Diagnosis Date    Blood clotting disorder     Breast cancer     Closed comminuted intertrochanteric fracture of femur     Colostomy status     Diabetes mellitus, type 2     History of deep vein thrombophlebitis of lower extremity        Past Surgical History:   Procedure Laterality Date    BLADDER SUSPENSION      CHOLECYSTECTOMY      HYSTERECTOMY      INSERTION OF INTRAMEDULLARY ROSA ELENA Right 08/03/2018    femur    MASTECTOMY Right        Review of patient's allergies indicates:   Allergen Reactions    Pregabalin      Other reaction(s): unknown       No current facility-administered medications on file prior to encounter.     Current  Outpatient Medications on File Prior to Encounter   Medication Sig    ciprofloxacin-dexAMETHasone 0.3-0.1% (CIPRODEX) 0.3-0.1 % DrpS Place 4 drops into both ears 2 (two) times daily.    TRESIBA FLEXTOUCH U-100 100 unit/mL (3 mL) insulin pen SMARTSI Unit(s) SUB-Q Every Evening    liraglutide 0.6 mg/0.1 mL, 18 mg/3 mL, subq PNIJ (VICTOZA 2-RADHA) 0.6 mg/0.1 mL (18 mg/3 mL) PnIj pen Inject 1.8 mg into the skin once daily.    [DISCONTINUED] OZEMPIC 0.25 mg or 0.5 mg(2 mg/1.5 mL) pen injector Inject 2 mg into the skin every 7 days.    [DISCONTINUED] OZEMPIC 1 mg/dose (4 mg/3 mL) Inject 1 mg into the skin every 7 days.    [DISCONTINUED] semaglutide (OZEMPIC) 0.25 mg or 0.5 mg(2 mg/1.5 mL) pen injector INJECT 1MG (2 SHOTS OF 0.5MG) SUBQ WEEKLY AS DIRECTED - ROTATE INJECTION SITES    [DISCONTINUED] semaglutide (OZEMPIC) 1 mg/dose (4 mg/3 mL) Inject 1 mg into the skin.     Family History    None       Tobacco Use    Smoking status: Never    Smokeless tobacco: Never   Substance and Sexual Activity    Alcohol use: Never    Drug use: Never    Sexual activity: Not Currently     Review of Systems   Constitutional: Negative.    HENT:  Positive for ear pain.    Eyes: Negative.    Respiratory: Negative.     Cardiovascular: Negative.    Gastrointestinal: Negative.    Endocrine: Negative.    Genitourinary:  Positive for dysuria and frequency.   Musculoskeletal: Negative.    Skin: Negative.    Allergic/Immunologic: Negative.    Neurological:  Positive for weakness.   Hematological: Negative.    Psychiatric/Behavioral: Negative.       Objective:     Vital Signs (Most Recent):  Temp: 97.6 °F (36.4 °C) (10/05/23 0723)  Pulse: 78 (10/05/23 0723)  Resp: 20 (10/05/23 0723)  BP: (!) 144/83 (10/05/23 0723)  SpO2: 97 % (10/05/23 0723) Vital Signs (24h Range):  Temp:  [97.4 °F (36.3 °C)-98 °F (36.7 °C)] 97.6 °F (36.4 °C)  Pulse:  [] 78  Resp:  [18-20] 20  SpO2:  [93 %-97 %] 97 %  BP: (102-144)/(60-91) 144/83     Weight: 71.3  "kg (157 lb 3.2 oz)  Body mass index is 28.75 kg/m².     Physical Exam  Constitutional:       Appearance: Normal appearance. She is obese.   HENT:      Head: Normocephalic and atraumatic.      Mouth/Throat:      Pharynx: Oropharynx is clear.   Eyes:      Extraocular Movements: Extraocular movements intact.      Conjunctiva/sclera: Conjunctivae normal.      Pupils: Pupils are equal, round, and reactive to light.   Cardiovascular:      Rate and Rhythm: Regular rhythm. Tachycardia present.   Pulmonary:      Effort: Pulmonary effort is normal.      Breath sounds: Normal breath sounds.   Abdominal:      General: Abdomen is flat. Bowel sounds are normal.      Palpations: Abdomen is soft.      Comments: Colostomy in place   Musculoskeletal:         General: Normal range of motion.      Cervical back: Normal range of motion and neck supple.   Skin:     General: Skin is warm and dry.   Neurological:      General: No focal deficit present.      Mental Status: She is alert and oriented to person, place, and time. Mental status is at baseline.      Comments: Speech is difficult to understand with slurring, left side lower facial droop   Psychiatric:         Mood and Affect: Mood normal.              CRANIAL NERVES     CN III, IV, VI   Pupils are equal, round, and reactive to light.       Significant Labs: All pertinent labs within the past 24 hours have been reviewed.  A1C:   Recent Labs   Lab 09/11/23  1006   HGBA1C 8.8*       ABGs: No results for input(s): "PH", "PCO2", "HCO3", "POCSATURATED", "BE", "TOTALHB", "COHB", "METHB", "O2HB", "POCFIO2", "PO2" in the last 48 hours.  Bilirubin:   Recent Labs   Lab 10/01/23  1545 10/02/23  0428   BILITOT 1.4* 1.1*       Blood Culture: No results for input(s): "LABBLOO" in the last 48 hours.  BMP:   Recent Labs   Lab 10/05/23  0415   *   K 4.0   CO2 28   BUN 15.0   CREATININE 0.56*   CALCIUM 8.7   MG 2.10       CBC:   Recent Labs   Lab 10/04/23  0447 10/05/23  0415   WBC 8.32 7.08 " "  HGB 13.0 13.1   HCT 37.5 37.1    214       CMP:   Recent Labs   Lab 10/04/23  0447 10/05/23  0415   * 129*   K 3.8 4.0   CO2 22 28   BUN 16.0 15.0   CREATININE 0.51* 0.56*   CALCIUM 8.3* 8.7       Cardiac Markers: No results for input(s): "CKMB", "MYOGLOBIN", "BNP", "TROPISTAT" in the last 48 hours.  Coagulation: No results for input(s): "PT", "INR", "APTT" in the last 48 hours.  Lactic Acid: No results for input(s): "LACTATE" in the last 48 hours.  Lipase: No results for input(s): "LIPASE" in the last 48 hours.  Lipid Panel: No results for input(s): "CHOL", "HDL", "LDLCALC", "TRIG", "CHOLHDL" in the last 48 hours.  Magnesium:   Recent Labs   Lab 10/04/23  0447 10/05/23  0415   MG 1.90 2.10       Pathology Results  (Last 10 years)      None          POCT Glucose:   Recent Labs   Lab 10/04/23  1647 10/04/23  2031 10/05/23  0705   POCTGLUCOSE 263* 326* 220*       Prealbumin: No results for input(s): "PREALBUMIN" in the last 48 hours.  Respiratory Culture: No results for input(s): "GSRESP", "RESPIRATORYC" in the last 48 hours.  Troponin:   No results for input(s): "TROPONINI", "TROPONINIHS" in the last 48 hours.    TSH: No results for input(s): "TSH" in the last 4320 hours.  Urine Culture: No results for input(s): "LABURIN" in the last 48 hours.    Urine Studies:   No results for input(s): "COLORU", "APPEARANCEUA", "PHUR", "SPECGRAV", "PROTEINUA", "GLUCUA", "KETONESU", "BILIRUBINUA", "OCCULTUA", "NITRITE", "UROBILINOGEN", "LEUKOCYTESUR", "RBCUA", "WBCUA", "BACTERIA", "SQUAMEPITHEL", "HYALINECASTS" in the last 48 hours.    Invalid input(s): "WRIGHTSUR"      Significant Imaging:    CXR  Left sided atelectasis      Assessment/Plan:      * Sepsis  This patient does have evidence of infective focus  My overall impression is sepsis.  Source: Urinary Tract  Antibiotics given-   Antibiotics (72h ago, onward)    Start     Stop Route Frequency Ordered    10/01/23 2100  ciprofloxacin-hydrocortisone 0.2-1% otic " "suspension 3 drop         -- LEFT EAR 2 times daily 10/01/23 1712    10/01/23 1815  cefTRIAXone (ROCEPHIN) 1 g in dextrose 5 % in water (D5W) 100 mL IVPB (MB+)         -- IV Every 24 hours (non-standard times) 10/01/23 1712        Latest lactate reviewed-  No results for input(s): "LACTATE" in the last 72 hours.  Organ dysfunction indicated by Encephalopathy    Fluid challenge Actual Body weight- Patient will receive 30ml/kg actual body weight to calculate fluid bolus for treatment of septic shock.     Post- resuscitation assessment No - Post resuscitation assessment not needed       Will Not start Pressors- Levophed for MAP of 65  Source control achieved by: contiue iv abx    Hyponatremia  Patient has hyponatremia which is uncontrolled,We will aim to correct the sodium by 4-6mEq in 24 hours. We will monitor sodium Daily. The hyponatremia is due to Dehydration/hypovolemia. We will treat the hyponatremia with Sodium tablets. The patient's sodium results have been reviewed and are listed below.  Recent Labs   Lab 10/04/23  0447   *   add salt tab BID today repeat level in AM     Decubitus ulcer of coccyx, stage 2  POA  Consult wound care      Falls frequently  Will consult PT  Case management to discuss with family, may consider SNF level of care post d/c      UTI (urinary tract infection)  Continue iv abx  F/u on cx  Gram negative betsey > 100,000  Continue Iv rocephin   E coli    Uncontrolled diabetes mellitus with hyperglycemia, with long-term current use of insulin  accuchecks  Increase detemir to 20U      Blood sugar better today 215 this AM         VTE Risk Mitigation (From admission, onward)         Ordered     enoxaparin injection 40 mg  Every 24 hours         10/01/23 1740     IP VTE HIGH RISK PATIENT  Once         10/01/23 1712                Discharge Planning   MIKE: 10/6/2023     Code Status: Full Code   Is the patient medically ready for discharge?:     Reason for patient still in hospital (select all " that apply): Patient trending condition, Laboratory test and Treatment  Discharge Plan A: Skilled Nursing Facility                  Mark Waite MD  Department of Hospital Medicine   [facility]

## 2023-10-05 NOTE — PT/OT/SLP PROGRESS
Physical Therapy Treatment    Patient Name:  Lanie Abad   MRN:  [id number]    Recommendations:     Discharge Recommendations: other (see comments) (TBD)  Discharge Equipment Recommendations: none  Barriers to discharge: None    Assessment:     Lanie Abad is a 89 y.o. female admitted with a medical diagnosis of Sepsis.  She presents with the following impairments/functional limitations: weakness, impaired endurance, impaired functional mobility, gait instability, impaired balance, decreased lower extremity function, decreased safety awareness     Patient tolerated supine to sit transfer to sit EOB and then transferred to upright chair to sit up and perform exercises. Patient performed BLE therex with min cues to continue exercises while sitting up.  Left up with family present    Rehab Prognosis: Good and Fair; patient would benefit from acute skilled PT services to address these deficits and reach maximum level of function.    Recent Surgery: * No surgery found *      Plan:     During this hospitalization, patient to be seen 5 x/week (5-6x weekly/1-2x daily) to address the identified rehab impairments via gait training, therapeutic activities, therapeutic exercises and progress toward the following goals:    Plan of Care Expires:  11/02/23    Subjective     Chief Complaint: weakness  Patient/Family Comments/goals: to get stronger to go home alone  Pain/Comfort:         Objective:     Communicated with nursing prior to session.  Patient found HOB elevated with peripheral IV, PureWick upon PT entry to room.     General Precautions: Standard, fall  Orthopedic Precautions: N/A  Braces: N/A  Respiratory Status: Room air     Functional Mobility:  Bed Mobility:     Supine to Sit: minimum assistance and moderate assistance  Sit to Supine: minimum assistance and moderate assistance  Transfers:     Sit to Stand:  minimum assistance and moderate assistance with no AD  Bed to Chair: moderate assistance with  no AD  using   Squat Pivot      AM-PAC 6 CLICK MOBILITY          Treatment & Education:  See above.     Patient left up in chair with all lines intact, call button in reach, and family present..    GOALS:   Multidisciplinary Problems       Physical Therapy Goals          Problem: Physical Therapy    Goal Priority Disciplines Outcome Goal Variances Interventions   Physical Therapy Goal     PT, PT/OT Ongoing, Progressing     Description: Goals to be met by: discharge     Patient will increase functional independence with mobility by performin. Supine to sit with Contact Guard Assistance  2. Sit to stand transfer with Contact Guard Assistance  3. Bed to chair transfer with Contact Guard Assistance using Rolling Walker                         Time Tracking:     PT Received On: 10/05/23  PT Start Time: 1300     PT Stop Time: 1330  PT Total Time (min): 30 min     Billable Minutes: Therapeutic Activity 20 and Therapeutic Exercise 10    Treatment Type: Treatment  PT/PTA: PT           10/05/2023

## 2023-10-05 NOTE — SUBJECTIVE & OBJECTIVE
Past Medical History:   Diagnosis Date    Blood clotting disorder     Breast cancer     Closed comminuted intertrochanteric fracture of femur     Colostomy status     Diabetes mellitus, type 2     History of deep vein thrombophlebitis of lower extremity        Past Surgical History:   Procedure Laterality Date    BLADDER SUSPENSION      CHOLECYSTECTOMY      HYSTERECTOMY      INSERTION OF INTRAMEDULLARY ROSA ELENA Right 2018    femur    MASTECTOMY Right        Review of patient's allergies indicates:   Allergen Reactions    Pregabalin      Other reaction(s): unknown       No current facility-administered medications on file prior to encounter.     Current Outpatient Medications on File Prior to Encounter   Medication Sig    ciprofloxacin-dexAMETHasone 0.3-0.1% (CIPRODEX) 0.3-0.1 % DrpS Place 4 drops into both ears 2 (two) times daily.    TRESIBA FLEXTOUCH U-100 100 unit/mL (3 mL) insulin pen SMARTSI Unit(s) SUB-Q Every Evening    liraglutide 0.6 mg/0.1 mL, 18 mg/3 mL, subq PNIJ (VICTOZA 2-RADHA) 0.6 mg/0.1 mL (18 mg/3 mL) PnIj pen Inject 1.8 mg into the skin once daily.    [DISCONTINUED] OZEMPIC 0.25 mg or 0.5 mg(2 mg/1.5 mL) pen injector Inject 2 mg into the skin every 7 days.    [DISCONTINUED] OZEMPIC 1 mg/dose (4 mg/3 mL) Inject 1 mg into the skin every 7 days.    [DISCONTINUED] semaglutide (OZEMPIC) 0.25 mg or 0.5 mg(2 mg/1.5 mL) pen injector INJECT 1MG (2 SHOTS OF 0.5MG) SUBQ WEEKLY AS DIRECTED - ROTATE INJECTION SITES    [DISCONTINUED] semaglutide (OZEMPIC) 1 mg/dose (4 mg/3 mL) Inject 1 mg into the skin.     Family History    None       Tobacco Use    Smoking status: Never    Smokeless tobacco: Never   Substance and Sexual Activity    Alcohol use: Never    Drug use: Never    Sexual activity: Not Currently     Review of Systems   Constitutional: Negative.    HENT:  Positive for ear pain.    Eyes: Negative.    Respiratory: Negative.     Cardiovascular: Negative.    Gastrointestinal: Negative.    Endocrine:  Negative.    Genitourinary:  Positive for dysuria and frequency.   Musculoskeletal: Negative.    Skin: Negative.    Allergic/Immunologic: Negative.    Neurological:  Positive for weakness.   Hematological: Negative.    Psychiatric/Behavioral: Negative.       Objective:     Vital Signs (Most Recent):  Temp: 97.6 °F (36.4 °C) (10/05/23 0723)  Pulse: 78 (10/05/23 0723)  Resp: 20 (10/05/23 0723)  BP: (!) 144/83 (10/05/23 0723)  SpO2: 97 % (10/05/23 0723) Vital Signs (24h Range):  Temp:  [97.4 °F (36.3 °C)-98 °F (36.7 °C)] 97.6 °F (36.4 °C)  Pulse:  [] 78  Resp:  [18-20] 20  SpO2:  [93 %-97 %] 97 %  BP: (102-144)/(60-91) 144/83     Weight: 71.3 kg (157 lb 3.2 oz)  Body mass index is 28.75 kg/m².     Physical Exam  Constitutional:       Appearance: Normal appearance. She is obese.   HENT:      Head: Normocephalic and atraumatic.      Mouth/Throat:      Pharynx: Oropharynx is clear.   Eyes:      Extraocular Movements: Extraocular movements intact.      Conjunctiva/sclera: Conjunctivae normal.      Pupils: Pupils are equal, round, and reactive to light.   Cardiovascular:      Rate and Rhythm: Regular rhythm. Tachycardia present.   Pulmonary:      Effort: Pulmonary effort is normal.      Breath sounds: Normal breath sounds.   Abdominal:      General: Abdomen is flat. Bowel sounds are normal.      Palpations: Abdomen is soft.      Comments: Colostomy in place   Musculoskeletal:         General: Normal range of motion.      Cervical back: Normal range of motion and neck supple.   Skin:     General: Skin is warm and dry.   Neurological:      General: No focal deficit present.      Mental Status: She is alert and oriented to person, place, and time. Mental status is at baseline.      Comments: Speech is difficult to understand with slurring, left side lower facial droop   Psychiatric:         Mood and Affect: Mood normal.              CRANIAL NERVES     CN III, IV, VI   Pupils are equal, round, and reactive to light.      "  Significant Labs: All pertinent labs within the past 24 hours have been reviewed.  A1C:   Recent Labs   Lab 09/11/23  1006   HGBA1C 8.8*       ABGs: No results for input(s): "PH", "PCO2", "HCO3", "POCSATURATED", "BE", "TOTALHB", "COHB", "METHB", "O2HB", "POCFIO2", "PO2" in the last 48 hours.  Bilirubin:   Recent Labs   Lab 10/01/23  1545 10/02/23  0428   BILITOT 1.4* 1.1*       Blood Culture: No results for input(s): "LABBLOO" in the last 48 hours.  BMP:   Recent Labs   Lab 10/05/23  0415   *   K 4.0   CO2 28   BUN 15.0   CREATININE 0.56*   CALCIUM 8.7   MG 2.10       CBC:   Recent Labs   Lab 10/04/23  0447 10/05/23  0415   WBC 8.32 7.08   HGB 13.0 13.1   HCT 37.5 37.1    214       CMP:   Recent Labs   Lab 10/04/23  0447 10/05/23  0415   * 129*   K 3.8 4.0   CO2 22 28   BUN 16.0 15.0   CREATININE 0.51* 0.56*   CALCIUM 8.3* 8.7       Cardiac Markers: No results for input(s): "CKMB", "MYOGLOBIN", "BNP", "TROPISTAT" in the last 48 hours.  Coagulation: No results for input(s): "PT", "INR", "APTT" in the last 48 hours.  Lactic Acid: No results for input(s): "LACTATE" in the last 48 hours.  Lipase: No results for input(s): "LIPASE" in the last 48 hours.  Lipid Panel: No results for input(s): "CHOL", "HDL", "LDLCALC", "TRIG", "CHOLHDL" in the last 48 hours.  Magnesium:   Recent Labs   Lab 10/04/23  0447 10/05/23  0415   MG 1.90 2.10       Pathology Results  (Last 10 years)      None          POCT Glucose:   Recent Labs   Lab 10/04/23  1647 10/04/23  2031 10/05/23  0705   POCTGLUCOSE 263* 326* 220*       Prealbumin: No results for input(s): "PREALBUMIN" in the last 48 hours.  Respiratory Culture: No results for input(s): "GSRESP", "RESPIRATORYC" in the last 48 hours.  Troponin:   No results for input(s): "TROPONINI", "TROPONINIHS" in the last 48 hours.    TSH: No results for input(s): "TSH" in the last 4320 hours.  Urine Culture: No results for input(s): "LABURIN" in the last 48 hours.    Urine Studies: " "  No results for input(s): "COLORU", "APPEARANCEUA", "PHUR", "SPECGRAV", "PROTEINUA", "GLUCUA", "KETONESU", "BILIRUBINUA", "OCCULTUA", "NITRITE", "UROBILINOGEN", "LEUKOCYTESUR", "RBCUA", "WBCUA", "BACTERIA", "SQUAMEPITHEL", "HYALINECASTS" in the last 48 hours.    Invalid input(s): "WRIGHTSUR"      Significant Imaging:    CXR  Left sided atelectasis  "

## 2023-10-05 NOTE — PT/OT/SLP PROGRESS
Physical Therapy Treatment    Patient Name:  Lanie Abad   MRN:  [id number]    Recommendations:     Discharge Recommendations: other (see comments) (TBD)  Discharge Equipment Recommendations: none  Barriers to discharge: None    Assessment:     Lanie Abad is a 89 y.o. female admitted with a medical diagnosis of Sepsis.  She presents with the following impairments/functional limitations: weakness, impaired endurance, impaired functional mobility, gait instability, impaired balance, decreased lower extremity function, decreased safety awareness     Patient transferred back to bed with mod A with max safety cues. With mod A to reposition self in bed.     Rehab Prognosis: Good and Fair; patient would benefit from acute skilled PT services to address these deficits and reach maximum level of function.    Recent Surgery: * No surgery found *      Plan:     During this hospitalization, patient to be seen 5 x/week (5-6x weekly/1-2x daily) to address the identified rehab impairments via gait training, therapeutic activities, therapeutic exercises and progress toward the following goals:    Plan of Care Expires:  11/02/23    Subjective     Chief Complaint: weakness  Patient/Family Comments/goals: to get stronger to go home alone  Pain/Comfort:         Objective:     Communicated with nursing prior to session.  Patient found HOB elevated with peripheral IV, PureWick upon PT entry to room.     General Precautions: Standard, fall  Orthopedic Precautions: N/A  Braces: N/A  Respiratory Status: Room air     Functional Mobility:  Bed Mobility:     Supine to Sit: minimum assistance and moderate assistance  Sit to Supine: minimum assistance and moderate assistance  Transfers:     Sit to Stand:  minimum assistance and moderate assistance with no AD  Bed to Chair: moderate assistance with  no AD  using  Squat Pivot      AM-PAC 6 CLICK MOBILITY          Treatment & Education:  See above.     Patient left HOB elevated with all lines  intact, call button in reach, and bed alarm on..    GOALS:   Multidisciplinary Problems       Physical Therapy Goals          Problem: Physical Therapy    Goal Priority Disciplines Outcome Goal Variances Interventions   Physical Therapy Goal     PT, PT/OT Ongoing, Progressing     Description: Goals to be met by: discharge     Patient will increase functional independence with mobility by performin. Supine to sit with Contact Guard Assistance  2. Sit to stand transfer with Contact Guard Assistance  3. Bed to chair transfer with Contact Guard Assistance using Rolling Walker                         Time Tracking:     PT Received On: 10/05/23  PT Start Time: 1430     PT Stop Time: 1445  PT Total Time (min): 15 min     Billable Minutes: Therapeutic Activity 15    Treatment Type: Treatment  PT/PTA: PT           10/05/2023

## 2023-10-06 LAB
ANION GAP SERPL CALC-SCNC: 8 MEQ/L (ref 2–13)
BASOPHILS # BLD AUTO: 0.03 X10(3)/MCL (ref 0.01–0.08)
BASOPHILS NFR BLD AUTO: 0.4 % (ref 0.1–1.2)
BUN SERPL-MCNC: 13 MG/DL (ref 7–20)
CALCIUM SERPL-MCNC: 8.4 MG/DL (ref 8.4–10.2)
CHLORIDE SERPL-SCNC: 96 MMOL/L (ref 98–110)
CO2 SERPL-SCNC: 23 MMOL/L (ref 21–32)
CREAT SERPL-MCNC: 0.47 MG/DL (ref 0.66–1.25)
CREAT/UREA NIT SERPL: 28 (ref 12–20)
EOSINOPHIL # BLD AUTO: 0.04 X10(3)/MCL (ref 0.04–0.36)
EOSINOPHIL NFR BLD AUTO: 0.5 % (ref 0.7–7)
ERYTHROCYTE [DISTWIDTH] IN BLOOD BY AUTOMATED COUNT: 13.3 % (ref 11–14.5)
GFR SERPLBLD CREATININE-BSD FMLA CKD-EPI: >90 MLS/MIN/1.73/M2
GLUCOSE SERPL-MCNC: 238 MG/DL (ref 70–115)
HCT VFR BLD AUTO: 35.9 % (ref 36–48)
HGB BLD-MCNC: 12.9 G/DL (ref 11.8–16)
IMM GRANULOCYTES # BLD AUTO: 0.06 X10(3)/MCL (ref 0–0.03)
IMM GRANULOCYTES NFR BLD AUTO: 0.8 % (ref 0–0.5)
LYMPHOCYTES # BLD AUTO: 1.24 X10(3)/MCL (ref 1.16–3.74)
LYMPHOCYTES NFR BLD AUTO: 16.6 % (ref 20–55)
MCH RBC QN AUTO: 31.9 PG (ref 27–34)
MCHC RBC AUTO-ENTMCNC: 35.9 G/DL (ref 31–37)
MCV RBC AUTO: 88.9 FL (ref 79–99)
MONOCYTES # BLD AUTO: 0.99 X10(3)/MCL (ref 0.24–0.36)
MONOCYTES NFR BLD AUTO: 13.3 % (ref 4.7–12.5)
NEUTROPHILS # BLD AUTO: 5.11 X10(3)/MCL (ref 1.56–6.13)
NEUTROPHILS NFR BLD AUTO: 68.4 % (ref 37–73)
NRBC BLD AUTO-RTO: 0 %
PLATELET # BLD AUTO: 239 X10(3)/MCL (ref 140–371)
PMV BLD AUTO: 9.6 FL (ref 9.4–12.4)
POCT GLUCOSE: 227 MG/DL (ref 70–110)
POCT GLUCOSE: 249 MG/DL (ref 70–110)
POCT GLUCOSE: 255 MG/DL (ref 70–110)
POCT GLUCOSE: 314 MG/DL (ref 70–110)
POTASSIUM SERPL-SCNC: 3.6 MMOL/L (ref 3.5–5.1)
RBC # BLD AUTO: 4.04 X10(6)/MCL (ref 4–5.1)
SODIUM SERPL-SCNC: 127 MMOL/L (ref 135–145)
WBC # SPEC AUTO: 7.47 X10(3)/MCL (ref 4–11.5)

## 2023-10-06 PROCEDURE — 25000003 PHARM REV CODE 250: Performed by: FAMILY MEDICINE

## 2023-10-06 PROCEDURE — 36415 COLL VENOUS BLD VENIPUNCTURE: CPT | Performed by: FAMILY MEDICINE

## 2023-10-06 PROCEDURE — 85025 COMPLETE CBC W/AUTO DIFF WBC: CPT | Performed by: FAMILY MEDICINE

## 2023-10-06 PROCEDURE — 11000001 HC ACUTE MED/SURG PRIVATE ROOM

## 2023-10-06 PROCEDURE — 94761 N-INVAS EAR/PLS OXIMETRY MLT: CPT

## 2023-10-06 PROCEDURE — 63600175 PHARM REV CODE 636 W HCPCS: Performed by: INTERNAL MEDICINE

## 2023-10-06 PROCEDURE — 80048 BASIC METABOLIC PNL TOTAL CA: CPT | Performed by: FAMILY MEDICINE

## 2023-10-06 PROCEDURE — 97110 THERAPEUTIC EXERCISES: CPT

## 2023-10-06 PROCEDURE — 97530 THERAPEUTIC ACTIVITIES: CPT

## 2023-10-06 PROCEDURE — 63600175 PHARM REV CODE 636 W HCPCS: Performed by: FAMILY MEDICINE

## 2023-10-06 RX ORDER — SODIUM CHLORIDE 1 G/1
1000 TABLET ORAL 3 TIMES DAILY
Status: DISCONTINUED | OUTPATIENT
Start: 2023-10-06 | End: 2023-10-09 | Stop reason: HOSPADM

## 2023-10-06 RX ADMIN — ACETAMINOPHEN 650 MG: 325 TABLET, FILM COATED ORAL at 08:10

## 2023-10-06 RX ADMIN — ENOXAPARIN SODIUM 40 MG: 40 INJECTION SUBCUTANEOUS at 06:10

## 2023-10-06 RX ADMIN — CEFTRIAXONE 2 G: 2 INJECTION, POWDER, FOR SOLUTION INTRAMUSCULAR; INTRAVENOUS at 10:10

## 2023-10-06 RX ADMIN — INSULIN ASPART 2 UNITS: 100 INJECTION, SOLUTION INTRAVENOUS; SUBCUTANEOUS at 07:10

## 2023-10-06 RX ADMIN — CIPROFLOXACIN HYDROCHLORIDE AND HYDROCORTISONE 3 DROP: 2; 10 SUSPENSION/ DROPS AURICULAR (OTIC) at 09:10

## 2023-10-06 RX ADMIN — INSULIN ASPART 1 UNITS: 100 INJECTION, SOLUTION INTRAVENOUS; SUBCUTANEOUS at 08:10

## 2023-10-06 RX ADMIN — CIPROFLOXACIN HYDROCHLORIDE AND HYDROCORTISONE 3 DROP: 2; 10 SUSPENSION/ DROPS AURICULAR (OTIC) at 08:10

## 2023-10-06 RX ADMIN — SODIUM CHLORIDE 1000 MG: 1 TABLET ORAL at 02:10

## 2023-10-06 RX ADMIN — INSULIN ASPART 4 UNITS: 100 INJECTION, SOLUTION INTRAVENOUS; SUBCUTANEOUS at 11:10

## 2023-10-06 RX ADMIN — SODIUM CHLORIDE 1000 MG: 1 TABLET ORAL at 08:10

## 2023-10-06 RX ADMIN — INSULIN ASPART 3 UNITS: 100 INJECTION, SOLUTION INTRAVENOUS; SUBCUTANEOUS at 06:10

## 2023-10-06 RX ADMIN — Medication 6 MG: at 08:10

## 2023-10-06 RX ADMIN — SODIUM CHLORIDE 1000 MG: 1 TABLET ORAL at 09:10

## 2023-10-06 RX ADMIN — INSULIN DETEMIR 14 UNITS: 100 INJECTION, SOLUTION SUBCUTANEOUS at 09:10

## 2023-10-06 NOTE — PT/OT/SLP PROGRESS
Physical Therapy Treatment    Patient Name:  Lanie Abad   MRN:  [id number]    Recommendations:     Discharge Recommendations: other (see comments) (TBD)  Discharge Equipment Recommendations: none  Barriers to discharge: None    Assessment:     Lanie Abad is a 89 y.o. female admitted with a medical diagnosis of Sepsis.  She presents with the following impairments/functional limitations: weakness, impaired endurance, impaired functional mobility, gait instability, impaired balance, decreased lower extremity function, decreased safety awareness     Patient tolerated supine to sit transfer to sit EOB and then transferred to upright chair to sit up and perform exercises. Patient performed BLE therex with min cues to continue exercises while sitting up.     Rehab Prognosis: Good and Fair; patient would benefit from acute skilled PT services to address these deficits and reach maximum level of function.    Recent Surgery: * No surgery found *      Plan:     During this hospitalization, patient to be seen 5 x/week (5-6x weekly/1-2x daily) to address the identified rehab impairments via gait training, therapeutic activities, therapeutic exercises and progress toward the following goals:    Plan of Care Expires:  11/02/23    Subjective     Chief Complaint: weakness  Patient/Family Comments/goals: to get stronger to go home alone  Pain/Comfort:         Objective:     Communicated with nursing prior to session.  Patient found HOB elevated with peripheral IV, PureWick upon PT entry to room.     General Precautions: Standard, fall  Orthopedic Precautions: N/A  Braces: N/A  Respiratory Status: Room air     Functional Mobility:  Bed Mobility:     Supine to Sit: minimum assistance and moderate assistance  Sit to Supine: minimum assistance and moderate assistance  Transfers:     Sit to Stand:  minimum assistance and moderate assistance with no AD  Bed to Chair: moderate assistance with  no AD  using  Squat Pivot      AM-PAC 6  CLICK MOBILITY          Treatment & Education:  See above.     Patient left up in chair with all lines intact, call button in reach, and family present..    GOALS:   Multidisciplinary Problems       Physical Therapy Goals          Problem: Physical Therapy    Goal Priority Disciplines Outcome Goal Variances Interventions   Physical Therapy Goal     PT, PT/OT Ongoing, Progressing     Description: Goals to be met by: discharge     Patient will increase functional independence with mobility by performin. Supine to sit with Contact Guard Assistance  2. Sit to stand transfer with Contact Guard Assistance  3. Bed to chair transfer with Contact Guard Assistance using Rolling Walker                         Time Tracking:     PT Received On: 10/06/23  PT Start Time: 1245     PT Stop Time: 1315  PT Total Time (min): 30 min     Billable Minutes: Therapeutic Activity 20 and Therapeutic Exercise 10    Treatment Type: Treatment  PT/PTA: PT           10/06/2023

## 2023-10-06 NOTE — SUBJECTIVE & OBJECTIVE
Past Medical History:   Diagnosis Date    Blood clotting disorder     Breast cancer     Closed comminuted intertrochanteric fracture of femur     Colostomy status     Diabetes mellitus, type 2     History of deep vein thrombophlebitis of lower extremity        Past Surgical History:   Procedure Laterality Date    BLADDER SUSPENSION      CHOLECYSTECTOMY      HYSTERECTOMY      INSERTION OF INTRAMEDULLARY ROSA ELENA Right 2018    femur    MASTECTOMY Right        Review of patient's allergies indicates:   Allergen Reactions    Pregabalin      Other reaction(s): unknown       No current facility-administered medications on file prior to encounter.     Current Outpatient Medications on File Prior to Encounter   Medication Sig    ciprofloxacin-dexAMETHasone 0.3-0.1% (CIPRODEX) 0.3-0.1 % DrpS Place 4 drops into both ears 2 (two) times daily.    TRESIBA FLEXTOUCH U-100 100 unit/mL (3 mL) insulin pen SMARTSI Unit(s) SUB-Q Every Evening    liraglutide 0.6 mg/0.1 mL, 18 mg/3 mL, subq PNIJ (VICTOZA 2-RADHA) 0.6 mg/0.1 mL (18 mg/3 mL) PnIj pen Inject 1.8 mg into the skin once daily.    [DISCONTINUED] OZEMPIC 0.25 mg or 0.5 mg(2 mg/1.5 mL) pen injector Inject 2 mg into the skin every 7 days.    [DISCONTINUED] OZEMPIC 1 mg/dose (4 mg/3 mL) Inject 1 mg into the skin every 7 days.    [DISCONTINUED] semaglutide (OZEMPIC) 0.25 mg or 0.5 mg(2 mg/1.5 mL) pen injector INJECT 1MG (2 SHOTS OF 0.5MG) SUBQ WEEKLY AS DIRECTED - ROTATE INJECTION SITES    [DISCONTINUED] semaglutide (OZEMPIC) 1 mg/dose (4 mg/3 mL) Inject 1 mg into the skin.     Family History    None       Tobacco Use    Smoking status: Never    Smokeless tobacco: Never   Substance and Sexual Activity    Alcohol use: Never    Drug use: Never    Sexual activity: Not Currently     Review of Systems   Constitutional: Negative.    HENT:  Positive for ear pain.    Eyes: Negative.    Respiratory: Negative.     Cardiovascular: Negative.    Gastrointestinal: Negative.    Endocrine:  Negative.    Genitourinary:  Positive for dysuria and frequency.   Musculoskeletal: Negative.    Skin: Negative.    Allergic/Immunologic: Negative.    Neurological:  Positive for weakness.   Hematological: Negative.    Psychiatric/Behavioral: Negative.       Objective:     Vital Signs (Most Recent):  Temp: 97.8 °F (36.6 °C) (10/06/23 0701)  Pulse: 76 (10/06/23 0801)  Resp: 18 (10/06/23 0801)  BP: 134/71 (10/06/23 0701)  SpO2: (!) 94 % (10/06/23 0801) Vital Signs (24h Range):  Temp:  [97.6 °F (36.4 °C)-98.6 °F (37 °C)] 97.8 °F (36.6 °C)  Pulse:  [76-88] 76  Resp:  [18-20] 18  SpO2:  [93 %-95 %] 94 %  BP: (119-134)/(58-76) 134/71     Weight: 71.8 kg (158 lb 3.2 oz)  Body mass index is 28.94 kg/m².     Physical Exam  Constitutional:       Appearance: Normal appearance. She is obese.   HENT:      Head: Normocephalic and atraumatic.      Mouth/Throat:      Pharynx: Oropharynx is clear.   Eyes:      Extraocular Movements: Extraocular movements intact.      Conjunctiva/sclera: Conjunctivae normal.      Pupils: Pupils are equal, round, and reactive to light.   Cardiovascular:      Rate and Rhythm: Regular rhythm. Tachycardia present.   Pulmonary:      Effort: Pulmonary effort is normal.      Breath sounds: Normal breath sounds.   Abdominal:      General: Abdomen is flat. Bowel sounds are normal.      Palpations: Abdomen is soft.      Comments: Colostomy in place   Musculoskeletal:         General: Normal range of motion.      Cervical back: Normal range of motion and neck supple.   Skin:     General: Skin is warm and dry.   Neurological:      General: No focal deficit present.      Mental Status: She is alert and oriented to person, place, and time. Mental status is at baseline.      Comments: Speech is difficult to understand with slurring, left side lower facial droop   Psychiatric:         Mood and Affect: Mood normal.              CRANIAL NERVES     CN III, IV, VI   Pupils are equal, round, and reactive to light.      "  Significant Labs: All pertinent labs within the past 24 hours have been reviewed.  A1C:   Recent Labs   Lab 09/11/23  1006   HGBA1C 8.8*       ABGs: No results for input(s): "PH", "PCO2", "HCO3", "POCSATURATED", "BE", "TOTALHB", "COHB", "METHB", "O2HB", "POCFIO2", "PO2" in the last 48 hours.  Bilirubin:   Recent Labs   Lab 10/01/23  1545 10/02/23  0428   BILITOT 1.4* 1.1*       Blood Culture: No results for input(s): "LABBLOO" in the last 48 hours.  BMP:   Recent Labs   Lab 10/05/23  0415 10/06/23  0435   * 127*   K 4.0 3.6   CO2 28 23   BUN 15.0 13.0   CREATININE 0.56* 0.47*   CALCIUM 8.7 8.4   MG 2.10  --        CBC:   Recent Labs   Lab 10/05/23  0415 10/06/23  0435   WBC 7.08 7.47   HGB 13.1 12.9   HCT 37.1 35.9*    239       CMP:   Recent Labs   Lab 10/05/23  0415 10/06/23  0435   * 127*   K 4.0 3.6   CO2 28 23   BUN 15.0 13.0   CREATININE 0.56* 0.47*   CALCIUM 8.7 8.4       Cardiac Markers: No results for input(s): "CKMB", "MYOGLOBIN", "BNP", "TROPISTAT" in the last 48 hours.  Coagulation: No results for input(s): "PT", "INR", "APTT" in the last 48 hours.  Lactic Acid: No results for input(s): "LACTATE" in the last 48 hours.  Lipase: No results for input(s): "LIPASE" in the last 48 hours.  Lipid Panel: No results for input(s): "CHOL", "HDL", "LDLCALC", "TRIG", "CHOLHDL" in the last 48 hours.  Magnesium:   Recent Labs   Lab 10/05/23  0415   MG 2.10       Pathology Results  (Last 10 years)      None          POCT Glucose:   Recent Labs   Lab 10/05/23  1607 10/05/23  2035 10/06/23  0718   POCTGLUCOSE 278* 245* 249*       Prealbumin: No results for input(s): "PREALBUMIN" in the last 48 hours.  Respiratory Culture: No results for input(s): "GSRESP", "RESPIRATORYC" in the last 48 hours.  Troponin:   No results for input(s): "TROPONINI", "TROPONINIHS" in the last 48 hours.    TSH: No results for input(s): "TSH" in the last 4320 hours.  Urine Culture: No results for input(s): "LABURIN" in the last " "48 hours.    Urine Studies:   No results for input(s): "COLORU", "APPEARANCEUA", "PHUR", "SPECGRAV", "PROTEINUA", "GLUCUA", "KETONESU", "BILIRUBINUA", "OCCULTUA", "NITRITE", "UROBILINOGEN", "LEUKOCYTESUR", "RBCUA", "WBCUA", "BACTERIA", "SQUAMEPITHEL", "HYALINECASTS" in the last 48 hours.    Invalid input(s): "WRIGHTSUR"      Significant Imaging:    CXR  Left sided atelectasis  "

## 2023-10-06 NOTE — PLAN OF CARE
10/06/23 1334   Discharge Reassessment   Assessment Type Discharge Planning Assessment   Did the patient's condition or plan change since previous assessment? Yes   Discharge Plan discussed with: Patient   Communicated MIKE with patient/caregiver Yes   Discharge Plan A Skilled Nursing Facility   DME Needed Upon Discharge  none   Why the patient remains in the hospital Requires continued medical care   Post-Acute Status   Post-Acute Authorization Placement  (GAW)   Post-Acute Placement Status Set-up Complete/Auth obtained   Coverage MCR   Discharge Delays (!) Procedure Scheduling (IR, OR, Labs, Echo, Cath, Echo, EEG)

## 2023-10-06 NOTE — PHYSICIAN QUERY
PT Name: Lanie Abad  MR #: [id number]     DOCUMENTATION CLARIFICATION     CDS/: Price Dias RN              Contact information:   [email or web address]     This form is a permanent document in the medical record.    Query Date: October 6, 2023    By submitting this query, we are merely seeking further clarification of documentation.  Please utilize your independent clinical judgment when addressing the question(s) below.  The Medical Record contains the following:   Indicators   Supporting Clinical Findings Location in Medical Record    Pneumonia documented     x Chest X-Ray/CT Scan 10 / 1  CXR: There are hazy and reticular opacities in left lung base which may reflect atelectasis or early infiltrate.   10/1 CXR   x PaO2    PaCO2     O2 sat Sats: 92-95% 10/1- 10/6  flow sheet- oxygenation section    WBC      Vital Signs      Cultures      x Treatment   Ceftriaxone, IV; 10/2;   10/5; indication: lower respiratory infection   MAR    Supplemental O2      Dysphagia/Swallow study     x Other 10/02/2023 pt admitted with frequent falls at home.  Lives with her son, she is a very poor historian, denies any pain complaints due to falls    Productive cough       Comments: Colostomy in place   Speech is difficult to understand with slurring, left side lower facial droop ; 10/4        Flow sheet - sputum section        10/2       Provider, please provide the diagnosis related to the above clinical indicators:    [   ] Aspiration Pneumonia with Bacterial pneumonia    [   ] Bacterial,  Pneumonia    [X   ] Unspecified Pneumonia   [   ] Other type of pneumonia (please specify): ________   [   ] Other respiratory diagnosis (please specify): _________       Please document in your progress notes daily for the duration of treatment, until resolved, and include in your discharge summary.     Form No. [id number]

## 2023-10-06 NOTE — PROGRESS NOTES
[facility] [place]/Surg  [facility] Medicine  Progress Note    Patient Name: Lanie Abad  MRN: [id number]  Patient Class: IP- Inpatient   Admission Date: 10/1/2023  Length of Stay: 5 days  Attending Physician: Garima Lindo MD  Primary Care Provider: Kaycee Esparza FNP-C        Subjective:     Principal Problem:Sepsis        HPI:  89 yo female with hx IDDM, Colostomy, Rt mastectomy due to breast CA, Recent Left er infection lives alone gets around with wheelchair presents with a fall in the bathroom couldn't get up. She had another fall 4 days ago after her bathroom chair chair broke. Denies Chest pain, SOB, productive cough, N/V/D, has dysuria, and frequency. Accompanied by her children daughter and son      Overview/Hospital Course:  10/02/2023 pt admitted with frequent falls at home.  Lives with her son, she is a very poor historian, denies any pain complaints due to falls.    10/3/2023 urine culture gram neg rosa elena > 100,000 on iv rocephin  Sodium 124 today   Move to chair with PT today   Case mgmt spoke with family today interested in SNF placement for therapy  10/4/2023 sodium still low today   Urine culture E coli sens to rocephin  Working with PT   10/5/2023   Feel better today   Lab improving  Case work on SNF  Awaiting PT   10/6/2023 no new c/o   Feel better today       Past Medical History:   Diagnosis Date    Blood clotting disorder     Breast cancer     Closed comminuted intertrochanteric fracture of femur     Colostomy status     Diabetes mellitus, type 2     History of deep vein thrombophlebitis of lower extremity        Past Surgical History:   Procedure Laterality Date    BLADDER SUSPENSION      CHOLECYSTECTOMY      HYSTERECTOMY      INSERTION OF INTRAMEDULLARY ROSA ELENA Right 08/03/2018    femur    MASTECTOMY Right        Review of patient's allergies indicates:   Allergen Reactions    Pregabalin      Other reaction(s): unknown       No current facility-administered medications  on file prior to encounter.     Current Outpatient Medications on File Prior to Encounter   Medication Sig    ciprofloxacin-dexAMETHasone 0.3-0.1% (CIPRODEX) 0.3-0.1 % DrpS Place 4 drops into both ears 2 (two) times daily.    TRESIBA FLEXTOUCH U-100 100 unit/mL (3 mL) insulin pen SMARTSI Unit(s) SUB-Q Every Evening    liraglutide 0.6 mg/0.1 mL, 18 mg/3 mL, subq PNIJ (VICTOZA 2-RADHA) 0.6 mg/0.1 mL (18 mg/3 mL) PnIj pen Inject 1.8 mg into the skin once daily.    [DISCONTINUED] OZEMPIC 0.25 mg or 0.5 mg(2 mg/1.5 mL) pen injector Inject 2 mg into the skin every 7 days.    [DISCONTINUED] OZEMPIC 1 mg/dose (4 mg/3 mL) Inject 1 mg into the skin every 7 days.    [DISCONTINUED] semaglutide (OZEMPIC) 0.25 mg or 0.5 mg(2 mg/1.5 mL) pen injector INJECT 1MG (2 SHOTS OF 0.5MG) SUBQ WEEKLY AS DIRECTED - ROTATE INJECTION SITES    [DISCONTINUED] semaglutide (OZEMPIC) 1 mg/dose (4 mg/3 mL) Inject 1 mg into the skin.     Family History    None       Tobacco Use    Smoking status: Never    Smokeless tobacco: Never   Substance and Sexual Activity    Alcohol use: Never    Drug use: Never    Sexual activity: Not Currently     Review of Systems   Constitutional: Negative.    HENT:  Positive for ear pain.    Eyes: Negative.    Respiratory: Negative.     Cardiovascular: Negative.    Gastrointestinal: Negative.    Endocrine: Negative.    Genitourinary:  Positive for dysuria and frequency.   Musculoskeletal: Negative.    Skin: Negative.    Allergic/Immunologic: Negative.    Neurological:  Positive for weakness.   Hematological: Negative.    Psychiatric/Behavioral: Negative.       Objective:     Vital Signs (Most Recent):  Temp: 97.8 °F (36.6 °C) (10/06/23 0701)  Pulse: 76 (10/06/23 0801)  Resp: 18 (10/06/23 0801)  BP: 134/71 (10/06/23 0701)  SpO2: (!) 94 % (10/06/23 0801) Vital Signs (24h Range):  Temp:  [97.6 °F (36.4 °C)-98.6 °F (37 °C)] 97.8 °F (36.6 °C)  Pulse:  [76-88] 76  Resp:  [18-20] 18  SpO2:  [93 %-95 %] 94 %  BP:  "(119-134)/(58-76) 134/71     Weight: 71.8 kg (158 lb 3.2 oz)  Body mass index is 28.94 kg/m².     Physical Exam  Constitutional:       Appearance: Normal appearance. She is obese.   HENT:      Head: Normocephalic and atraumatic.      Mouth/Throat:      Pharynx: Oropharynx is clear.   Eyes:      Extraocular Movements: Extraocular movements intact.      Conjunctiva/sclera: Conjunctivae normal.      Pupils: Pupils are equal, round, and reactive to light.   Cardiovascular:      Rate and Rhythm: Regular rhythm. Tachycardia present.   Pulmonary:      Effort: Pulmonary effort is normal.      Breath sounds: Normal breath sounds.   Abdominal:      General: Abdomen is flat. Bowel sounds are normal.      Palpations: Abdomen is soft.      Comments: Colostomy in place   Musculoskeletal:         General: Normal range of motion.      Cervical back: Normal range of motion and neck supple.   Skin:     General: Skin is warm and dry.   Neurological:      General: No focal deficit present.      Mental Status: She is alert and oriented to person, place, and time. Mental status is at baseline.      Comments: Speech is difficult to understand with slurring, left side lower facial droop   Psychiatric:         Mood and Affect: Mood normal.              CRANIAL NERVES     CN III, IV, VI   Pupils are equal, round, and reactive to light.       Significant Labs: All pertinent labs within the past 24 hours have been reviewed.  A1C:   Recent Labs   Lab 09/11/23  1006   HGBA1C 8.8*       ABGs: No results for input(s): "PH", "PCO2", "HCO3", "POCSATURATED", "BE", "TOTALHB", "COHB", "METHB", "O2HB", "POCFIO2", "PO2" in the last 48 hours.  Bilirubin:   Recent Labs   Lab 10/01/23  1545 10/02/23  0428   BILITOT 1.4* 1.1*       Blood Culture: No results for input(s): "LABBLOO" in the last 48 hours.  BMP:   Recent Labs   Lab 10/05/23  0415 10/06/23  0435   * 127*   K 4.0 3.6   CO2 28 23   BUN 15.0 13.0   CREATININE 0.56* 0.47*   CALCIUM 8.7 8.4   MG " "2.10  --        CBC:   Recent Labs   Lab 10/05/23  0415 10/06/23  0435   WBC 7.08 7.47   HGB 13.1 12.9   HCT 37.1 35.9*    239       CMP:   Recent Labs   Lab 10/05/23  0415 10/06/23  0435   * 127*   K 4.0 3.6   CO2 28 23   BUN 15.0 13.0   CREATININE 0.56* 0.47*   CALCIUM 8.7 8.4       Cardiac Markers: No results for input(s): "CKMB", "MYOGLOBIN", "BNP", "TROPISTAT" in the last 48 hours.  Coagulation: No results for input(s): "PT", "INR", "APTT" in the last 48 hours.  Lactic Acid: No results for input(s): "LACTATE" in the last 48 hours.  Lipase: No results for input(s): "LIPASE" in the last 48 hours.  Lipid Panel: No results for input(s): "CHOL", "HDL", "LDLCALC", "TRIG", "CHOLHDL" in the last 48 hours.  Magnesium:   Recent Labs   Lab 10/05/23  0415   MG 2.10       Pathology Results  (Last 10 years)      None          POCT Glucose:   Recent Labs   Lab 10/05/23  1607 10/05/23  2035 10/06/23  0718   POCTGLUCOSE 278* 245* 249*       Prealbumin: No results for input(s): "PREALBUMIN" in the last 48 hours.  Respiratory Culture: No results for input(s): "GSRESP", "RESPIRATORYC" in the last 48 hours.  Troponin:   No results for input(s): "TROPONINI", "TROPONINIHS" in the last 48 hours.    TSH: No results for input(s): "TSH" in the last 4320 hours.  Urine Culture: No results for input(s): "LABURIN" in the last 48 hours.    Urine Studies:   No results for input(s): "COLORU", "APPEARANCEUA", "PHUR", "SPECGRAV", "PROTEINUA", "GLUCUA", "KETONESU", "BILIRUBINUA", "OCCULTUA", "NITRITE", "UROBILINOGEN", "LEUKOCYTESUR", "RBCUA", "WBCUA", "BACTERIA", "SQUAMEPITHEL", "HYALINECASTS" in the last 48 hours.    Invalid input(s): "WRIGHTSUR"      Significant Imaging:    CXR  Left sided atelectasis      Assessment/Plan:      * Sepsis  This patient does have evidence of infective focus  My overall impression is sepsis.  Source: Urinary Tract  Antibiotics given-   Antibiotics (72h ago, onward)    Start     Stop Route Frequency " "Ordered    10/01/23 2100  ciprofloxacin-hydrocortisone 0.2-1% otic suspension 3 drop         -- LEFT EAR 2 times daily 10/01/23 1712    10/01/23 1815  cefTRIAXone (ROCEPHIN) 1 g in dextrose 5 % in water (D5W) 100 mL IVPB (MB+)         -- IV Every 24 hours (non-standard times) 10/01/23 1712        Latest lactate reviewed-  No results for input(s): "LACTATE" in the last 72 hours.  Organ dysfunction indicated by Encephalopathy    Fluid challenge Actual Body weight- Patient will receive 30ml/kg actual body weight to calculate fluid bolus for treatment of septic shock.     Post- resuscitation assessment No - Post resuscitation assessment not needed       Will Not start Pressors- Levophed for MAP of 65  Source control achieved by: contiue iv abx    Hyponatremia  Patient has hyponatremia which is uncontrolled,We will aim to correct the sodium by 4-6mEq in 24 hours. We will monitor sodium Daily. The hyponatremia is due to Dehydration/hypovolemia. We will treat the hyponatremia with Sodium tablets. The patient's sodium results have been reviewed and are listed below.  Recent Labs   Lab 10/04/23  0447   *   add salt tab BID today repeat level in AM     Decubitus ulcer of coccyx, stage 2  POA  Consult wound care      Falls frequently  Will consult PT  Case management to discuss with family, may consider SNF level of care post d/c      UTI (urinary tract infection)  Continue iv abx  F/u on cx  Gram negative betsey > 100,000  Continue Iv rocephin   E coli    Uncontrolled diabetes mellitus with hyperglycemia, with long-term current use of insulin  accuchecks  Increase detemir to 20U      Blood sugar better today 215 this AM         VTE Risk Mitigation (From admission, onward)         Ordered     enoxaparin injection 40 mg  Every 24 hours         10/01/23 1740     IP VTE HIGH RISK PATIENT  Once         10/01/23 1712                Discharge Planning   MIKE: 10/6/2023     Code Status: Full Code   Is the patient medically ready " for discharge?:     Reason for patient still in hospital (select all that apply): Patient trending condition, Laboratory test and Treatment  Discharge Plan A: Skilled Nursing Facility                  Mark Waite MD  Department of Hospital Medicine   [facility]/Surg

## 2023-10-06 NOTE — PHYSICIAN QUERY
PT Name: Lanie Abad  MR #: [id number]     DOCUMENTATION CLARIFICATION     CDS/: Price Dias RN, CCDS              Contact information:   [email or web address]     This form is a permanent document in the medical record.     Query Date: October 6, 2023    By submitting this query, we are merely seeking further clarification of documentation.  Please utilize your independent clinical judgment when addressing the question(s) below.  The Medical Record contains the following:  Indicators Supporting Clinical Findings Location in Medical Record    HR         RR          BP        Temp Temp:  [99.5 °F (37.5 °C)] 99.5 °F; Pulse:  [] 104  Resp:  [2-20] 20;   BP: (103-118)/(55-74) 115/60    T: 98.3 °F ;  Pulse:  [] 88;  Resp:  [2-20] 18;    BP: (103-137)/(55-80) 136/76;    T: 99.2 ;   Pulse:  [83-96] 85;   Resp:  [16-20] 16;   BP: (124-140)/(66-78) 126/66;    T:  98.3 °F;  Pulse:  [80-91] 80;  Resp:  [18-20] 18;     BP: (107-144)/(61-79) 128/68 10/1 H&P;  HM      10/2   HM      10/3  HM      10/4  HM        Lactic Acid          Procalcitonin     x WBC           Bands          CRP 10/1  WBC:  13.84  10/2   WBC: 11.99     Labs     x Culture(s) Urine culture; Ecoli; >=100,000 10/1  micro      AMS, Confusion, LOC, etc.       x Organ Dysfunction/Failure Sepsis; Organ dysfunction indicated by Encephalopathy     10/2  HM   x Bacteremia or Sepsis / Septic Clinical Sepsis due to UTI check Ucx, Bcx, started on Rocephin    Principal Problem:Sepsis  she is a very poor historian  My overall impression is sepsis.  Source: Urinary Tract   10/1  H&P:  HM    10/2- 10/5   HM    Known or Suspected Source of Infection documented      (Failed) Outpatient Treatment     x Medication Ceftriaxone, IV; 10/2;   10/5  Cefepime, IV; 10/4     MAR    Treatment      Other          Provider, after intensive study, please specify diagnosis or diagnoses associated with above clinical findings.  [ X  ] SIRS with infection but  without Sepsis   [   ] Sepsis due to Ecoli   [   ] Sepsis Ruled Out   [   ] Other Infectious Disease (please specify): __________         Please document in your progress notes daily for the duration of treatment until resolved and include in your discharge summary.

## 2023-10-06 NOTE — PROGRESS NOTES
[facility]-Med/Surg  Wound Care    Patient Name:  Lanie Abad   MRN:  [id number]  Date: 10/6/2023  Diagnosis: Sepsis    History:     Past Medical History:   Diagnosis Date    Blood clotting disorder     Breast cancer     Closed comminuted intertrochanteric fracture of femur     Colostomy status     Diabetes mellitus, type 2     History of deep vein thrombophlebitis of lower extremity        Social History     Socioeconomic History    Marital status:    Tobacco Use    Smoking status: Never    Smokeless tobacco: Never   Substance and Sexual Activity    Alcohol use: Never    Drug use: Never    Sexual activity: Not Currently     Social Determinants of Health     Financial Resource Strain: Low Risk  (10/3/2023)    Overall Financial Resource Strain (CARDIA)     Difficulty of Paying Living Expenses: Not hard at all   Food Insecurity: No Food Insecurity (10/3/2023)    Hunger Vital Sign     Worried About Running Out of Food in the Last Year: Never true     Ran Out of Food in the Last Year: Never true   Transportation Needs: No Transportation Needs (10/3/2023)    PRAPARE - Transportation     Lack of Transportation (Medical): No     Lack of Transportation (Non-Medical): No   Physical Activity: Inactive (10/3/2023)    Exercise Vital Sign     Days of Exercise per Week: 0 days     Minutes of Exercise per Session: 0 min   Stress: No Stress Concern Present (10/3/2023)    [employer] - Occupational Stress Questionnaire     Feeling of Stress : Not at all   Social Connections: Socially Isolated (10/3/2023)    Social Connection and Isolation Panel [NHANES]     Frequency of Communication with Friends and Family: More than three times a week     Frequency of Social Gatherings with Friends and Family: Twice a week     Attends Zoroastrian Services: Never     Active Member of Clubs or Organizations: No     Attends Club or Organization Meetings: Never     Marital Status:    Housing  Stability: Low Risk  (10/3/2023)    Housing Stability Vital Sign     Unable to Pay for Housing in the Last Year: No     Number of Places Lived in the Last Year: 1     Unstable Housing in the Last Year: No       Precautions:     Allergies as of 10/01/2023 - Reviewed 10/01/2023   Allergen Reaction Noted    Pregabalin  02/08/2023       WOC Assessment Details/Treatment        10/06/23 1027   WOCN Assessment   WOCN Total Time (mins) 30   Visit Date 10/06/23   Visit Time 1000   Consult Type Follow Up   WOCN Speciality Wound   Wound pressure   Number of Wounds 2   Intervention assessed;changed;applied   Teaching on-going   Skin Interventions   Pressure Reduction Devices pressure-redistributing mattress utilized   Pressure Reduction Techniques weight shift assistance provided   Positioning   Body Position supine   Head of Bed (HOB) Positioning HOB elevated   Positioning/Transfer Devices pillows;in use   Pressure Injury Prevention    Check Moisture Management Pad Done        Altered Skin Integrity 10/01/23 1838 Left Buttocks #1 Partial thickness tissue loss. Shallow open ulcer with a red or pink wound bed, without slough. Intact or Open/Ruptured Serum-filled blister.   Date First Assessed/Time First Assessed: 10/01/23 1838   Altered Skin Integrity Present on Admission - Did Patient arrive to the hospital with altered skin?: yes  Side: Left  Location: Buttocks  Wound Number: #1  Is this injury device related?: No  De...   Wound Image    Description of Altered Skin Integrity Partial thickness tissue loss. Shallow open ulcer with a red or pink wound bed, without slough. Intact or Open/Ruptured Serum-filled blister.   Dressing Appearance Dry;Intact;Clean   Drainage Amount None   Drainage Characteristics/Odor No odor   Appearance [place];Dry   Periwound Area Intact   Wound Edges Defined   Wound Length (cm) 0.7 cm   Wound Width (cm) 0.5 cm   Wound Depth (cm) 0.1 cm   Wound Volume (cm^3) 0.035 cm^3   Wound Surface Area (cm^2) 0.35  cm^2   Care Cleansed with:;Sterile normal saline;Applied:;Skin Barrier   Dressing Applied;Foam   Dressing Change Due 10/10/23        Altered Skin Integrity 10/02/23 0915 Coccyx Partial thickness tissue loss. Shallow open ulcer with a red or pink wound bed, without slough. Intact or Open/Ruptured Serum-filled blister.   Date First Assessed/Time First Assessed: 10/02/23 0915   Altered Skin Integrity Present on Admission - Did Patient arrive to the hospital with altered skin?: yes  Location: Coccyx  Description of Altered Skin Integrity: Partial thickness tissue loss. ...   Wound Image    Description of Altered Skin Integrity Partial thickness tissue loss. Shallow open ulcer with a red or pink wound bed, without slough. Intact or Open/Ruptured Serum-filled blister.   Dressing Appearance Intact;Dry;Clean   Drainage Amount None   Drainage Characteristics/Odor No odor   Periwound Area Pale white   Wound Length (cm) 0.2 cm   Wound Width (cm) 0.1 cm   Wound Depth (cm) 0.1 cm   Wound Volume (cm^3) 0.002 cm^3   Wound Surface Area (cm^2) 0.02 cm^2   Care Cleansed with:;Sterile normal saline;Applied:;Skin Barrier   Dressing Applied;Foam   Dressing Change Due 10/10/23     10/06/2023

## 2023-10-07 LAB
ANION GAP SERPL CALC-SCNC: 10 MEQ/L (ref 2–13)
BASOPHILS # BLD AUTO: 0.03 X10(3)/MCL (ref 0.01–0.08)
BASOPHILS NFR BLD AUTO: 0.3 % (ref 0.1–1.2)
BUN SERPL-MCNC: 11 MG/DL (ref 7–20)
CALCIUM SERPL-MCNC: 8.5 MG/DL (ref 8.4–10.2)
CHLORIDE SERPL-SCNC: 97 MMOL/L (ref 98–110)
CO2 SERPL-SCNC: 21 MMOL/L (ref 21–32)
CREAT SERPL-MCNC: 0.44 MG/DL (ref 0.66–1.25)
CREAT/UREA NIT SERPL: 25 (ref 12–20)
EOSINOPHIL # BLD AUTO: 0.01 X10(3)/MCL (ref 0.04–0.36)
EOSINOPHIL NFR BLD AUTO: 0.1 % (ref 0.7–7)
ERYTHROCYTE [DISTWIDTH] IN BLOOD BY AUTOMATED COUNT: 13.3 % (ref 11–14.5)
GFR SERPLBLD CREATININE-BSD FMLA CKD-EPI: >90 MLS/MIN/1.73/M2
GLUCOSE SERPL-MCNC: 242 MG/DL (ref 70–115)
HCT VFR BLD AUTO: 37.6 % (ref 36–48)
HGB BLD-MCNC: 13.1 G/DL (ref 11.8–16)
IMM GRANULOCYTES # BLD AUTO: 0.1 X10(3)/MCL (ref 0–0.03)
IMM GRANULOCYTES NFR BLD AUTO: 1 % (ref 0–0.5)
LYMPHOCYTES # BLD AUTO: 0.7 X10(3)/MCL (ref 1.16–3.74)
LYMPHOCYTES NFR BLD AUTO: 7.1 % (ref 20–55)
MCH RBC QN AUTO: 31 PG (ref 27–34)
MCHC RBC AUTO-ENTMCNC: 34.8 G/DL (ref 31–37)
MCV RBC AUTO: 88.9 FL (ref 79–99)
MONOCYTES # BLD AUTO: 1.14 X10(3)/MCL (ref 0.24–0.36)
MONOCYTES NFR BLD AUTO: 11.5 % (ref 4.7–12.5)
NEUTROPHILS # BLD AUTO: 7.94 X10(3)/MCL (ref 1.56–6.13)
NEUTROPHILS NFR BLD AUTO: 80 % (ref 37–73)
NRBC BLD AUTO-RTO: 0 %
PLATELET # BLD AUTO: 245 X10(3)/MCL (ref 140–371)
PMV BLD AUTO: 8.5 FL (ref 9.4–12.4)
POCT GLUCOSE: 204 MG/DL (ref 70–110)
POCT GLUCOSE: 226 MG/DL (ref 70–110)
POCT GLUCOSE: 257 MG/DL (ref 70–110)
POCT GLUCOSE: 292 MG/DL (ref 70–110)
POTASSIUM SERPL-SCNC: 3.7 MMOL/L (ref 3.5–5.1)
RBC # BLD AUTO: 4.23 X10(6)/MCL (ref 4–5.1)
SODIUM SERPL-SCNC: 128 MMOL/L (ref 135–145)
WBC # SPEC AUTO: 9.92 X10(3)/MCL (ref 4–11.5)

## 2023-10-07 PROCEDURE — 80048 BASIC METABOLIC PNL TOTAL CA: CPT | Performed by: FAMILY MEDICINE

## 2023-10-07 PROCEDURE — 85025 COMPLETE CBC W/AUTO DIFF WBC: CPT | Performed by: FAMILY MEDICINE

## 2023-10-07 PROCEDURE — 11000001 HC ACUTE MED/SURG PRIVATE ROOM

## 2023-10-07 PROCEDURE — 94761 N-INVAS EAR/PLS OXIMETRY MLT: CPT

## 2023-10-07 PROCEDURE — 63600175 PHARM REV CODE 636 W HCPCS: Performed by: FAMILY MEDICINE

## 2023-10-07 PROCEDURE — 25000003 PHARM REV CODE 250: Performed by: FAMILY MEDICINE

## 2023-10-07 PROCEDURE — 36415 COLL VENOUS BLD VENIPUNCTURE: CPT | Performed by: FAMILY MEDICINE

## 2023-10-07 PROCEDURE — 63600175 PHARM REV CODE 636 W HCPCS: Performed by: INTERNAL MEDICINE

## 2023-10-07 RX ORDER — CIPROFLOXACIN 500 MG/1
500 TABLET ORAL EVERY 12 HOURS
Status: DISCONTINUED | OUTPATIENT
Start: 2023-10-07 | End: 2023-10-09

## 2023-10-07 RX ADMIN — SODIUM CHLORIDE 1000 MG: 1 TABLET ORAL at 08:10

## 2023-10-07 RX ADMIN — ACETAMINOPHEN 650 MG: 325 TABLET, FILM COATED ORAL at 09:10

## 2023-10-07 RX ADMIN — INSULIN ASPART 2 UNITS: 100 INJECTION, SOLUTION INTRAVENOUS; SUBCUTANEOUS at 05:10

## 2023-10-07 RX ADMIN — INSULIN ASPART 1 UNITS: 100 INJECTION, SOLUTION INTRAVENOUS; SUBCUTANEOUS at 08:10

## 2023-10-07 RX ADMIN — CIPROFLOXACIN HYDROCHLORIDE 500 MG: 500 TABLET, FILM COATED ORAL at 08:10

## 2023-10-07 RX ADMIN — SODIUM CHLORIDE 1000 MG: 1 TABLET ORAL at 03:10

## 2023-10-07 RX ADMIN — SODIUM CHLORIDE 1000 MG: 1 TABLET ORAL at 09:10

## 2023-10-07 RX ADMIN — CIPROFLOXACIN HYDROCHLORIDE AND HYDROCORTISONE 3 DROP: 2; 10 SUSPENSION/ DROPS AURICULAR (OTIC) at 09:10

## 2023-10-07 RX ADMIN — INSULIN ASPART 3 UNITS: 100 INJECTION, SOLUTION INTRAVENOUS; SUBCUTANEOUS at 11:10

## 2023-10-07 RX ADMIN — INSULIN ASPART 2 UNITS: 100 INJECTION, SOLUTION INTRAVENOUS; SUBCUTANEOUS at 07:10

## 2023-10-07 RX ADMIN — CEFTRIAXONE 2 G: 2 INJECTION, POWDER, FOR SOLUTION INTRAMUSCULAR; INTRAVENOUS at 09:10

## 2023-10-07 RX ADMIN — CIPROFLOXACIN HYDROCHLORIDE 500 MG: 500 TABLET, FILM COATED ORAL at 01:10

## 2023-10-07 RX ADMIN — Medication 6 MG: at 09:10

## 2023-10-07 RX ADMIN — CIPROFLOXACIN HYDROCHLORIDE AND HYDROCORTISONE 3 DROP: 2; 10 SUSPENSION/ DROPS AURICULAR (OTIC) at 08:10

## 2023-10-07 RX ADMIN — ENOXAPARIN SODIUM 40 MG: 40 INJECTION SUBCUTANEOUS at 05:10

## 2023-10-07 RX ADMIN — ACETAMINOPHEN 650 MG: 325 TABLET, FILM COATED ORAL at 04:10

## 2023-10-07 RX ADMIN — INSULIN DETEMIR 14 UNITS: 100 INJECTION, SOLUTION SUBCUTANEOUS at 09:10

## 2023-10-07 NOTE — SUBJECTIVE & OBJECTIVE
Past Medical History:   Diagnosis Date    Blood clotting disorder     Breast cancer     Closed comminuted intertrochanteric fracture of femur     Colostomy status     Diabetes mellitus, type 2     History of deep vein thrombophlebitis of lower extremity        Past Surgical History:   Procedure Laterality Date    BLADDER SUSPENSION      CHOLECYSTECTOMY      HYSTERECTOMY      INSERTION OF INTRAMEDULLARY ROSA ELENA Right 2018    femur    MASTECTOMY Right        Review of patient's allergies indicates:   Allergen Reactions    Pregabalin      Other reaction(s): unknown       No current facility-administered medications on file prior to encounter.     Current Outpatient Medications on File Prior to Encounter   Medication Sig    ciprofloxacin-dexAMETHasone 0.3-0.1% (CIPRODEX) 0.3-0.1 % DrpS Place 4 drops into both ears 2 (two) times daily.    TRESIBA FLEXTOUCH U-100 100 unit/mL (3 mL) insulin pen SMARTSI Unit(s) SUB-Q Every Evening    liraglutide 0.6 mg/0.1 mL, 18 mg/3 mL, subq PNIJ (VICTOZA 2-RADHA) 0.6 mg/0.1 mL (18 mg/3 mL) PnIj pen Inject 1.8 mg into the skin once daily.    [DISCONTINUED] OZEMPIC 0.25 mg or 0.5 mg(2 mg/1.5 mL) pen injector Inject 2 mg into the skin every 7 days.    [DISCONTINUED] OZEMPIC 1 mg/dose (4 mg/3 mL) Inject 1 mg into the skin every 7 days.    [DISCONTINUED] semaglutide (OZEMPIC) 0.25 mg or 0.5 mg(2 mg/1.5 mL) pen injector INJECT 1MG (2 SHOTS OF 0.5MG) SUBQ WEEKLY AS DIRECTED - ROTATE INJECTION SITES    [DISCONTINUED] semaglutide (OZEMPIC) 1 mg/dose (4 mg/3 mL) Inject 1 mg into the skin.     Family History    None       Tobacco Use    Smoking status: Never    Smokeless tobacco: Never   Substance and Sexual Activity    Alcohol use: Never    Drug use: Never    Sexual activity: Not Currently     Review of Systems   Constitutional: Negative.    HENT:  Positive for ear pain.    Eyes: Negative.    Respiratory: Negative.     Cardiovascular: Negative.    Gastrointestinal: Negative.    Endocrine:  Negative.    Genitourinary:  Positive for dysuria and frequency.   Musculoskeletal: Negative.    Skin: Negative.    Allergic/Immunologic: Negative.    Neurological:  Positive for weakness.   Hematological: Negative.    Psychiatric/Behavioral: Negative.       Objective:     Vital Signs (Most Recent):  Temp: 97.3 °F (36.3 °C) (10/07/23 1155)  Pulse: 87 (10/07/23 1155)  Resp: 18 (10/07/23 1155)  BP: (!) 152/89 (10/07/23 1155)  SpO2: 98 % (10/07/23 1155) Vital Signs (24h Range):  Temp:  [97.3 °F (36.3 °C)-100.1 °F (37.8 °C)] 97.3 °F (36.3 °C)  Pulse:  [78-87] 87  Resp:  [18-20] 18  SpO2:  [91 %-99 %] 98 %  BP: (100-166)/(46-89) 152/89     Weight: 75.6 kg (166 lb 10.7 oz)  Body mass index is 30.48 kg/m².     Physical Exam  Constitutional:       Appearance: Normal appearance. She is obese.   HENT:      Head: Normocephalic and atraumatic.      Mouth/Throat:      Pharynx: Oropharynx is clear.   Eyes:      Extraocular Movements: Extraocular movements intact.      Conjunctiva/sclera: Conjunctivae normal.      Pupils: Pupils are equal, round, and reactive to light.   Cardiovascular:      Rate and Rhythm: Regular rhythm. Tachycardia present.   Pulmonary:      Effort: Pulmonary effort is normal.      Breath sounds: Normal breath sounds.   Abdominal:      General: Abdomen is flat. Bowel sounds are normal.      Palpations: Abdomen is soft.      Comments: Colostomy in place   Musculoskeletal:         General: Normal range of motion.      Cervical back: Normal range of motion and neck supple.   Skin:     General: Skin is warm and dry.   Neurological:      General: No focal deficit present.      Mental Status: She is alert and oriented to person, place, and time. Mental status is at baseline.      Comments: Speech is difficult to understand with slurring, left side lower facial droop   Psychiatric:         Mood and Affect: Mood normal.              CRANIAL NERVES     CN III, IV, VI   Pupils are equal, round, and reactive to  "light.       Significant Labs: All pertinent labs within the past 24 hours have been reviewed.  A1C:   Recent Labs   Lab 09/11/23  1006   HGBA1C 8.8*       ABGs: No results for input(s): "PH", "PCO2", "HCO3", "POCSATURATED", "BE", "TOTALHB", "COHB", "METHB", "O2HB", "POCFIO2", "PO2" in the last 48 hours.  Bilirubin:   Recent Labs   Lab 10/01/23  1545 10/02/23  0428   BILITOT 1.4* 1.1*       Blood Culture: No results for input(s): "LABBLOO" in the last 48 hours.  BMP:   Recent Labs   Lab 10/07/23  0522   *   K 3.7   CO2 21   BUN 11.0   CREATININE 0.44*   CALCIUM 8.5       CBC:   Recent Labs   Lab 10/06/23  0435 10/07/23  0522   WBC 7.47 9.92   HGB 12.9 13.1   HCT 35.9* 37.6    245       CMP:   Recent Labs   Lab 10/06/23  0435 10/07/23  0522   * 128*   K 3.6 3.7   CO2 23 21   BUN 13.0 11.0   CREATININE 0.47* 0.44*   CALCIUM 8.4 8.5       Cardiac Markers: No results for input(s): "CKMB", "MYOGLOBIN", "BNP", "TROPISTAT" in the last 48 hours.  Coagulation: No results for input(s): "PT", "INR", "APTT" in the last 48 hours.  Lactic Acid: No results for input(s): "LACTATE" in the last 48 hours.  Lipase: No results for input(s): "LIPASE" in the last 48 hours.  Lipid Panel: No results for input(s): "CHOL", "HDL", "LDLCALC", "TRIG", "CHOLHDL" in the last 48 hours.  Magnesium: No results for input(s): "MG" in the last 48 hours.    Pathology Results  (Last 10 years)      None          POCT Glucose:   Recent Labs   Lab 10/06/23  1822 10/06/23  2044 10/07/23  1146   POCTGLUCOSE 255* 227* 292*       Prealbumin: No results for input(s): "PREALBUMIN" in the last 48 hours.  Respiratory Culture: No results for input(s): "GSRESP", "RESPIRATORYC" in the last 48 hours.  Troponin:   No results for input(s): "TROPONINI", "TROPONINIHS" in the last 48 hours.    TSH: No results for input(s): "TSH" in the last 4320 hours.  Urine Culture: No results for input(s): "LABURIN" in the last 48 hours.    Urine Studies:   No results " "for input(s): "COLORU", "APPEARANCEUA", "PHUR", "SPECGRAV", "PROTEINUA", "GLUCUA", "KETONESU", "BILIRUBINUA", "OCCULTUA", "NITRITE", "UROBILINOGEN", "LEUKOCYTESUR", "RBCUA", "WBCUA", "BACTERIA", "SQUAMEPITHEL", "HYALINECASTS" in the last 48 hours.    Invalid input(s): "WRIGHTSUR"      Significant Imaging:    CXR  Left sided atelectasis  "

## 2023-10-07 NOTE — PLAN OF CARE
Problem: Adult Inpatient Plan of Care  Goal: Plan of Care Review  Outcome: Ongoing, Progressing  Goal: Patient-Specific Goal (Individualized)  Outcome: Ongoing, Progressing  Goal: Absence of Hospital-Acquired Illness or Injury  Outcome: Ongoing, Progressing  Goal: Optimal Comfort and Wellbeing  Outcome: Ongoing, Progressing  Goal: Readiness for Transition of Care  Outcome: Ongoing, Progressing     Problem: Diabetes Comorbidity  Goal: Blood Glucose Level Within Targeted Range  Outcome: Ongoing, Progressing     Problem: Skin Injury Risk Increased  Goal: Skin Health and Integrity  Outcome: Ongoing, Progressing     Problem: Impaired Wound Healing  Goal: Optimal Wound Healing  Outcome: Ongoing, Progressing     Problem: Fall Injury Risk  Goal: Absence of Fall and Fall-Related Injury  Outcome: Ongoing, Progressing     Problem: Adjustment to Illness (Sepsis/Septic Shock)  Goal: Optimal Coping  Outcome: Ongoing, Progressing     Problem: Bleeding (Sepsis/Septic Shock)  Goal: Absence of Bleeding  Outcome: Ongoing, Progressing     Problem: Glycemic Control Impaired (Sepsis/Septic Shock)  Goal: Blood Glucose Level Within Desired Range  Outcome: Ongoing, Progressing     Problem: Infection Progression (Sepsis/Septic Shock)  Goal: Absence of Infection Signs and Symptoms  Outcome: Ongoing, Progressing     Problem: Nutrition Impaired (Sepsis/Septic Shock)  Goal: Optimal Nutrition Intake  Outcome: Ongoing, Progressing     Problem: Electrolyte Imbalance  Goal: Electrolyte Balance  Outcome: Ongoing, Progressing

## 2023-10-07 NOTE — PROGRESS NOTES
[facility]/Surg  [facility] Medicine  Progress Note    Patient Name: Lanie Abad  MRN: [id number]  Patient Class: IP- Inpatient   Admission Date: 10/1/2023  Length of Stay: 6 days  Attending Physician: Garima Lindo MD  Primary Care Provider: Kaycee Esparza FNP-C        Subjective:     Principal Problem:Sepsis        HPI:  89 yo female with hx IDDM, Colostomy, Rt mastectomy due to breast CA, Recent Left er infection lives alone gets around with wheelchair presents with a fall in the bathroom couldn't get up. She had another fall 4 days ago after her bathroom chair chair broke. Denies Chest pain, SOB, productive cough, N/V/D, has dysuria, and frequency. Accompanied by her children daughter and son      Overview/Hospital Course:  10/02/2023 pt admitted with frequent falls at home.  Lives with her son, she is a very poor historian, denies any pain complaints due to falls.    10/3/2023 urine culture gram neg rosa elena > 100,000 on iv rocephin  Sodium 124 today   Move to chair with PT today   Case mgmt spoke with family today interested in SNF placement for therapy  10/4/2023 sodium still low today   Urine culture E coli sens to rocephin  Working with PT   10/5/2023   Feel better today   Lab improving  Case work on SNF  Awaiting PT   10/6/2023 no new c/o   Feel better today   10/7/2023 lab improving  Awaiting PT      Past Medical History:   Diagnosis Date    Blood clotting disorder     Breast cancer     Closed comminuted intertrochanteric fracture of femur     Colostomy status     Diabetes mellitus, type 2     History of deep vein thrombophlebitis of lower extremity        Past Surgical History:   Procedure Laterality Date    BLADDER SUSPENSION      CHOLECYSTECTOMY      HYSTERECTOMY      INSERTION OF INTRAMEDULLARY ROSA ELENA Right 08/03/2018    femur    MASTECTOMY Right        Review of patient's allergies indicates:   Allergen Reactions    Pregabalin      Other reaction(s): unknown       No  current facility-administered medications on file prior to encounter.     Current Outpatient Medications on File Prior to Encounter   Medication Sig    ciprofloxacin-dexAMETHasone 0.3-0.1% (CIPRODEX) 0.3-0.1 % DrpS Place 4 drops into both ears 2 (two) times daily.    TRESIBA FLEXTOUCH U-100 100 unit/mL (3 mL) insulin pen SMARTSI Unit(s) SUB-Q Every Evening    liraglutide 0.6 mg/0.1 mL, 18 mg/3 mL, subq PNIJ (VICTOZA 2-RADHA) 0.6 mg/0.1 mL (18 mg/3 mL) PnIj pen Inject 1.8 mg into the skin once daily.    [DISCONTINUED] OZEMPIC 0.25 mg or 0.5 mg(2 mg/1.5 mL) pen injector Inject 2 mg into the skin every 7 days.    [DISCONTINUED] OZEMPIC 1 mg/dose (4 mg/3 mL) Inject 1 mg into the skin every 7 days.    [DISCONTINUED] semaglutide (OZEMPIC) 0.25 mg or 0.5 mg(2 mg/1.5 mL) pen injector INJECT 1MG (2 SHOTS OF 0.5MG) SUBQ WEEKLY AS DIRECTED - ROTATE INJECTION SITES    [DISCONTINUED] semaglutide (OZEMPIC) 1 mg/dose (4 mg/3 mL) Inject 1 mg into the skin.     Family History    None       Tobacco Use    Smoking status: Never    Smokeless tobacco: Never   Substance and Sexual Activity    Alcohol use: Never    Drug use: Never    Sexual activity: Not Currently     Review of Systems   Constitutional: Negative.    HENT:  Positive for ear pain.    Eyes: Negative.    Respiratory: Negative.     Cardiovascular: Negative.    Gastrointestinal: Negative.    Endocrine: Negative.    Genitourinary:  Positive for dysuria and frequency.   Musculoskeletal: Negative.    Skin: Negative.    Allergic/Immunologic: Negative.    Neurological:  Positive for weakness.   Hematological: Negative.    Psychiatric/Behavioral: Negative.       Objective:     Vital Signs (Most Recent):  Temp: 97.3 °F (36.3 °C) (10/07/23 115)  Pulse: 87 (10/07/23 1155)  Resp: 18 (10/07/23 115)  BP: (!) 152/89 (10/07/23 1155)  SpO2: 98 % (10/07/23 1155) Vital Signs (24h Range):  Temp:  [97.3 °F (36.3 °C)-100.1 °F (37.8 °C)] 97.3 °F (36.3 °C)  Pulse:  [78-87] 87  Resp:   "[18-20] 18  SpO2:  [91 %-99 %] 98 %  BP: (100-166)/(46-89) 152/89     Weight: 75.6 kg (166 lb 10.7 oz)  Body mass index is 30.48 kg/m².     Physical Exam  Constitutional:       Appearance: Normal appearance. She is obese.   HENT:      Head: Normocephalic and atraumatic.      Mouth/Throat:      Pharynx: Oropharynx is clear.   Eyes:      Extraocular Movements: Extraocular movements intact.      Conjunctiva/sclera: Conjunctivae normal.      Pupils: Pupils are equal, round, and reactive to light.   Cardiovascular:      Rate and Rhythm: Regular rhythm. Tachycardia present.   Pulmonary:      Effort: Pulmonary effort is normal.      Breath sounds: Normal breath sounds.   Abdominal:      General: Abdomen is flat. Bowel sounds are normal.      Palpations: Abdomen is soft.      Comments: Colostomy in place   Musculoskeletal:         General: Normal range of motion.      Cervical back: Normal range of motion and neck supple.   Skin:     General: Skin is warm and dry.   Neurological:      General: No focal deficit present.      Mental Status: She is alert and oriented to person, place, and time. Mental status is at baseline.      Comments: Speech is difficult to understand with slurring, left side lower facial droop   Psychiatric:         Mood and Affect: Mood normal.              CRANIAL NERVES     CN III, IV, VI   Pupils are equal, round, and reactive to light.       Significant Labs: All pertinent labs within the past 24 hours have been reviewed.  A1C:   Recent Labs   Lab 09/11/23  1006   HGBA1C 8.8*       ABGs: No results for input(s): "PH", "PCO2", "HCO3", "POCSATURATED", "BE", "TOTALHB", "COHB", "METHB", "O2HB", "POCFIO2", "PO2" in the last 48 hours.  Bilirubin:   Recent Labs   Lab 10/01/23  1545 10/02/23  0428   BILITOT 1.4* 1.1*       Blood Culture: No results for input(s): "LABBLOO" in the last 48 hours.  BMP:   Recent Labs   Lab 10/07/23  0522   *   K 3.7   CO2 21   BUN 11.0   CREATININE 0.44*   CALCIUM 8.5 " "      CBC:   Recent Labs   Lab 10/06/23  0435 10/07/23  0522   WBC 7.47 9.92   HGB 12.9 13.1   HCT 35.9* 37.6    245       CMP:   Recent Labs   Lab 10/06/23  0435 10/07/23  0522   * 128*   K 3.6 3.7   CO2 23 21   BUN 13.0 11.0   CREATININE 0.47* 0.44*   CALCIUM 8.4 8.5       Cardiac Markers: No results for input(s): "CKMB", "MYOGLOBIN", "BNP", "TROPISTAT" in the last 48 hours.  Coagulation: No results for input(s): "PT", "INR", "APTT" in the last 48 hours.  Lactic Acid: No results for input(s): "LACTATE" in the last 48 hours.  Lipase: No results for input(s): "LIPASE" in the last 48 hours.  Lipid Panel: No results for input(s): "CHOL", "HDL", "LDLCALC", "TRIG", "CHOLHDL" in the last 48 hours.  Magnesium: No results for input(s): "MG" in the last 48 hours.    Pathology Results  (Last 10 years)      None          POCT Glucose:   Recent Labs   Lab 10/06/23  1822 10/06/23  2044 10/07/23  1146   POCTGLUCOSE 255* 227* 292*       Prealbumin: No results for input(s): "PREALBUMIN" in the last 48 hours.  Respiratory Culture: No results for input(s): "GSRESP", "RESPIRATORYC" in the last 48 hours.  Troponin:   No results for input(s): "TROPONINI", "TROPONINIHS" in the last 48 hours.    TSH: No results for input(s): "TSH" in the last 4320 hours.  Urine Culture: No results for input(s): "LABURIN" in the last 48 hours.    Urine Studies:   No results for input(s): "COLORU", "APPEARANCEUA", "PHUR", "SPECGRAV", "PROTEINUA", "GLUCUA", "KETONESU", "BILIRUBINUA", "OCCULTUA", "NITRITE", "UROBILINOGEN", "LEUKOCYTESUR", "RBCUA", "WBCUA", "BACTERIA", "SQUAMEPITHEL", "HYALINECASTS" in the last 48 hours.    Invalid input(s): "WRIGHTSUR"      Significant Imaging:    CXR  Left sided atelectasis      Assessment/Plan:      * Sepsis  This patient does have evidence of infective focus  My overall impression is sepsis.  Source: Urinary Tract  Antibiotics given-   Antibiotics (72h ago, onward)    Start     Stop Route Frequency Ordered    " "10/01/23 2100  ciprofloxacin-hydrocortisone 0.2-1% otic suspension 3 drop         -- LEFT EAR 2 times daily 10/01/23 1712    10/01/23 1815  cefTRIAXone (ROCEPHIN) 1 g in dextrose 5 % in water (D5W) 100 mL IVPB (MB+)         -- IV Every 24 hours (non-standard times) 10/01/23 1712        Latest lactate reviewed-  No results for input(s): "LACTATE" in the last 72 hours.  Organ dysfunction indicated by Encephalopathy    Fluid challenge Actual Body weight- Patient will receive 30ml/kg actual body weight to calculate fluid bolus for treatment of septic shock.     Post- resuscitation assessment No - Post resuscitation assessment not needed       Will Not start Pressors- Levophed for MAP of 65  Source control achieved by: contiue iv abx    Hyponatremia  Patient has hyponatremia which is uncontrolled,We will aim to correct the sodium by 4-6mEq in 24 hours. We will monitor sodium Daily. The hyponatremia is due to Dehydration/hypovolemia. We will treat the hyponatremia with Sodium tablets. The patient's sodium results have been reviewed and are listed below.  Recent Labs   Lab 10/04/23  0447   *   add salt tab BID today repeat level in AM     Decubitus ulcer of coccyx, stage 2  POA  Consult wound care      Falls frequently  Will consult PT  Case management to discuss with family, may consider SNF level of care post d/c      UTI (urinary tract infection)  Continue iv abx  F/u on cx  Gram negative betsey > 100,000  Continue Iv rocephin   E coli    Uncontrolled diabetes mellitus with hyperglycemia, with long-term current use of insulin  accuchecks  Increase detemir to 20U      Blood sugar better today 215 this AM         VTE Risk Mitigation (From admission, onward)         Ordered     enoxaparin injection 40 mg  Every 24 hours         10/01/23 1740     IP VTE HIGH RISK PATIENT  Once         10/01/23 1712                Discharge Planning   MIKE: 10/11/2023     Code Status: Full Code   Is the patient medically ready for " discharge?:     Reason for patient still in hospital (select all that apply): Patient trending condition, Treatment and PT / OT recommendations  Discharge Plan A: Skilled Nursing Facility   Discharge Delays: (!) Procedure Scheduling (IR, OR, Labs, Echo, Cath, Echo, EEG)              Mark Waite MD  Department of Hospital Medicine   [facility]

## 2023-10-08 LAB
ANION GAP SERPL CALC-SCNC: 7 MEQ/L (ref 2–13)
BASOPHILS # BLD AUTO: 0.03 X10(3)/MCL (ref 0.01–0.08)
BASOPHILS NFR BLD AUTO: 0.3 % (ref 0.1–1.2)
BUN SERPL-MCNC: 11 MG/DL (ref 7–20)
CALCIUM SERPL-MCNC: 8.9 MG/DL (ref 8.4–10.2)
CHLORIDE SERPL-SCNC: 98 MMOL/L (ref 98–110)
CO2 SERPL-SCNC: 25 MMOL/L (ref 21–32)
CREAT SERPL-MCNC: 0.43 MG/DL (ref 0.66–1.25)
CREAT/UREA NIT SERPL: 26 (ref 12–20)
EOSINOPHIL # BLD AUTO: 0.04 X10(3)/MCL (ref 0.04–0.36)
EOSINOPHIL NFR BLD AUTO: 0.4 % (ref 0.7–7)
ERYTHROCYTE [DISTWIDTH] IN BLOOD BY AUTOMATED COUNT: 13.3 % (ref 11–14.5)
GFR SERPLBLD CREATININE-BSD FMLA CKD-EPI: >90 MLS/MIN/1.73/M2
GLUCOSE SERPL-MCNC: 226 MG/DL (ref 70–115)
HCT VFR BLD AUTO: 39.9 % (ref 36–48)
HGB BLD-MCNC: 13.8 G/DL (ref 11.8–16)
IMM GRANULOCYTES # BLD AUTO: 0.11 X10(3)/MCL (ref 0–0.03)
IMM GRANULOCYTES NFR BLD AUTO: 1.2 % (ref 0–0.5)
LYMPHOCYTES # BLD AUTO: 1.53 X10(3)/MCL (ref 1.16–3.74)
LYMPHOCYTES NFR BLD AUTO: 16.2 % (ref 20–55)
MCH RBC QN AUTO: 30.7 PG (ref 27–34)
MCHC RBC AUTO-ENTMCNC: 34.6 G/DL (ref 31–37)
MCV RBC AUTO: 88.9 FL (ref 79–99)
MONOCYTES # BLD AUTO: 1.09 X10(3)/MCL (ref 0.24–0.36)
MONOCYTES NFR BLD AUTO: 11.5 % (ref 4.7–12.5)
NEUTROPHILS # BLD AUTO: 6.67 X10(3)/MCL (ref 1.56–6.13)
NEUTROPHILS NFR BLD AUTO: 70.4 % (ref 37–73)
NRBC BLD AUTO-RTO: 0 %
PLATELET # BLD AUTO: 268 X10(3)/MCL (ref 140–371)
PMV BLD AUTO: 8.6 FL (ref 9.4–12.4)
POCT GLUCOSE: 223 MG/DL (ref 70–110)
POCT GLUCOSE: 233 MG/DL (ref 70–110)
POCT GLUCOSE: 251 MG/DL (ref 70–110)
POCT GLUCOSE: 327 MG/DL (ref 70–110)
POTASSIUM SERPL-SCNC: 3.9 MMOL/L (ref 3.5–5.1)
RBC # BLD AUTO: 4.49 X10(6)/MCL (ref 4–5.1)
SODIUM SERPL-SCNC: 130 MMOL/L (ref 135–145)
WBC # SPEC AUTO: 9.47 X10(3)/MCL (ref 4–11.5)

## 2023-10-08 PROCEDURE — 36415 COLL VENOUS BLD VENIPUNCTURE: CPT | Performed by: FAMILY MEDICINE

## 2023-10-08 PROCEDURE — 80048 BASIC METABOLIC PNL TOTAL CA: CPT | Performed by: FAMILY MEDICINE

## 2023-10-08 PROCEDURE — 25000003 PHARM REV CODE 250: Performed by: FAMILY MEDICINE

## 2023-10-08 PROCEDURE — 63600175 PHARM REV CODE 636 W HCPCS: Performed by: INTERNAL MEDICINE

## 2023-10-08 PROCEDURE — 94761 N-INVAS EAR/PLS OXIMETRY MLT: CPT

## 2023-10-08 PROCEDURE — 11000001 HC ACUTE MED/SURG PRIVATE ROOM

## 2023-10-08 PROCEDURE — 85025 COMPLETE CBC W/AUTO DIFF WBC: CPT | Performed by: FAMILY MEDICINE

## 2023-10-08 RX ADMIN — INSULIN ASPART 2 UNITS: 100 INJECTION, SOLUTION INTRAVENOUS; SUBCUTANEOUS at 08:10

## 2023-10-08 RX ADMIN — CIPROFLOXACIN HYDROCHLORIDE 500 MG: 500 TABLET, FILM COATED ORAL at 08:10

## 2023-10-08 RX ADMIN — ACETAMINOPHEN 650 MG: 325 TABLET, FILM COATED ORAL at 11:10

## 2023-10-08 RX ADMIN — SODIUM CHLORIDE 1000 MG: 1 TABLET ORAL at 08:10

## 2023-10-08 RX ADMIN — Medication 6 MG: at 08:10

## 2023-10-08 RX ADMIN — INSULIN DETEMIR 14 UNITS: 100 INJECTION, SOLUTION SUBCUTANEOUS at 08:10

## 2023-10-08 RX ADMIN — INSULIN ASPART 3 UNITS: 100 INJECTION, SOLUTION INTRAVENOUS; SUBCUTANEOUS at 05:10

## 2023-10-08 RX ADMIN — CIPROFLOXACIN HYDROCHLORIDE AND HYDROCORTISONE 3 DROP: 2; 10 SUSPENSION/ DROPS AURICULAR (OTIC) at 08:10

## 2023-10-08 RX ADMIN — INSULIN ASPART 2 UNITS: 100 INJECTION, SOLUTION INTRAVENOUS; SUBCUTANEOUS at 10:10

## 2023-10-08 RX ADMIN — INSULIN ASPART 2 UNITS: 100 INJECTION, SOLUTION INTRAVENOUS; SUBCUTANEOUS at 07:10

## 2023-10-08 RX ADMIN — ENOXAPARIN SODIUM 40 MG: 40 INJECTION SUBCUTANEOUS at 05:10

## 2023-10-08 RX ADMIN — SODIUM CHLORIDE 1000 MG: 1 TABLET ORAL at 03:10

## 2023-10-08 NOTE — SUBJECTIVE & OBJECTIVE
Past Medical History:   Diagnosis Date    Blood clotting disorder     Breast cancer     Closed comminuted intertrochanteric fracture of femur     Colostomy status     Diabetes mellitus, type 2     History of deep vein thrombophlebitis of lower extremity        Past Surgical History:   Procedure Laterality Date    BLADDER SUSPENSION      CHOLECYSTECTOMY      HYSTERECTOMY      INSERTION OF INTRAMEDULLARY ROSA ELENA Right 2018    femur    MASTECTOMY Right        Review of patient's allergies indicates:   Allergen Reactions    Pregabalin      Other reaction(s): unknown       No current facility-administered medications on file prior to encounter.     Current Outpatient Medications on File Prior to Encounter   Medication Sig    ciprofloxacin-dexAMETHasone 0.3-0.1% (CIPRODEX) 0.3-0.1 % DrpS Place 4 drops into both ears 2 (two) times daily.    TRESIBA FLEXTOUCH U-100 100 unit/mL (3 mL) insulin pen SMARTSI Unit(s) SUB-Q Every Evening    liraglutide 0.6 mg/0.1 mL, 18 mg/3 mL, subq PNIJ (VICTOZA 2-RADHA) 0.6 mg/0.1 mL (18 mg/3 mL) PnIj pen Inject 1.8 mg into the skin once daily.    [DISCONTINUED] OZEMPIC 0.25 mg or 0.5 mg(2 mg/1.5 mL) pen injector Inject 2 mg into the skin every 7 days.    [DISCONTINUED] OZEMPIC 1 mg/dose (4 mg/3 mL) Inject 1 mg into the skin every 7 days.    [DISCONTINUED] semaglutide (OZEMPIC) 0.25 mg or 0.5 mg(2 mg/1.5 mL) pen injector INJECT 1MG (2 SHOTS OF 0.5MG) SUBQ WEEKLY AS DIRECTED - ROTATE INJECTION SITES    [DISCONTINUED] semaglutide (OZEMPIC) 1 mg/dose (4 mg/3 mL) Inject 1 mg into the skin.     Family History    None       Tobacco Use    Smoking status: Never    Smokeless tobacco: Never   Substance and Sexual Activity    Alcohol use: Never    Drug use: Never    Sexual activity: Not Currently     Review of Systems   Constitutional: Negative.    HENT:  Positive for ear pain.    Eyes: Negative.    Respiratory: Negative.     Cardiovascular: Negative.    Gastrointestinal: Negative.    Endocrine:  Negative.    Genitourinary:  Positive for dysuria and frequency.   Musculoskeletal: Negative.    Skin: Negative.    Allergic/Immunologic: Negative.    Neurological:  Positive for weakness.   Hematological: Negative.    Psychiatric/Behavioral: Negative.       Objective:     Vital Signs (Most Recent):  Temp: 97.3 °F (36.3 °C) (10/08/23 1125)  Pulse: 80 (10/08/23 1125)  Resp: 18 (10/08/23 1125)  BP: (!) 164/73 (10/08/23 1125)  SpO2: 95 % (10/08/23 1125) Vital Signs (24h Range):  Temp:  [97.2 °F (36.2 °C)-98.3 °F (36.8 °C)] 97.3 °F (36.3 °C)  Pulse:  [71-91] 80  Resp:  [16-20] 18  SpO2:  [93 %-97 %] 95 %  BP: (126-164)/(67-93) 164/73     Weight: 75.6 kg (166 lb 10.7 oz)  Body mass index is 30.48 kg/m².     Physical Exam  Constitutional:       Appearance: Normal appearance. She is obese.   HENT:      Head: Normocephalic and atraumatic.      Mouth/Throat:      Pharynx: Oropharynx is clear.   Eyes:      Extraocular Movements: Extraocular movements intact.      Conjunctiva/sclera: Conjunctivae normal.      Pupils: Pupils are equal, round, and reactive to light.   Cardiovascular:      Rate and Rhythm: Regular rhythm. Tachycardia present.   Pulmonary:      Effort: Pulmonary effort is normal.      Breath sounds: Normal breath sounds.   Abdominal:      General: Abdomen is flat. Bowel sounds are normal.      Palpations: Abdomen is soft.      Comments: Colostomy in place   Musculoskeletal:         General: Normal range of motion.      Cervical back: Normal range of motion and neck supple.   Skin:     General: Skin is warm and dry.   Neurological:      General: No focal deficit present.      Mental Status: She is alert and oriented to person, place, and time. Mental status is at baseline.      Comments: Speech is difficult to understand with slurring, left side lower facial droop   Psychiatric:         Mood and Affect: Mood normal.              CRANIAL NERVES     CN III, IV, VI   Pupils are equal, round, and reactive to light.      "  Significant Labs: All pertinent labs within the past 24 hours have been reviewed.  A1C:   Recent Labs   Lab 09/11/23  1006   HGBA1C 8.8*       ABGs: No results for input(s): "PH", "PCO2", "HCO3", "POCSATURATED", "BE", "TOTALHB", "COHB", "METHB", "O2HB", "POCFIO2", "PO2" in the last 48 hours.  Bilirubin:   Recent Labs   Lab 10/01/23  1545 10/02/23  0428   BILITOT 1.4* 1.1*       Blood Culture: No results for input(s): "LABBLOO" in the last 48 hours.  BMP:   Recent Labs   Lab 10/08/23  0603   *   K 3.9   CO2 25   BUN 11.0   CREATININE 0.43*   CALCIUM 8.9       CBC:   Recent Labs   Lab 10/07/23  0522 10/08/23  0603   WBC 9.92 9.47   HGB 13.1 13.8   HCT 37.6 39.9    268       CMP:   Recent Labs   Lab 10/07/23  0522 10/08/23  0603   * 130*   K 3.7 3.9   CO2 21 25   BUN 11.0 11.0   CREATININE 0.44* 0.43*   CALCIUM 8.5 8.9       Cardiac Markers: No results for input(s): "CKMB", "MYOGLOBIN", "BNP", "TROPISTAT" in the last 48 hours.  Coagulation: No results for input(s): "PT", "INR", "APTT" in the last 48 hours.  Lactic Acid: No results for input(s): "LACTATE" in the last 48 hours.  Lipase: No results for input(s): "LIPASE" in the last 48 hours.  Lipid Panel: No results for input(s): "CHOL", "HDL", "LDLCALC", "TRIG", "CHOLHDL" in the last 48 hours.  Magnesium: No results for input(s): "MG" in the last 48 hours.    Pathology Results  (Last 10 years)      None          POCT Glucose:   Recent Labs   Lab 10/07/23  2029 10/08/23  0756 10/08/23  1044   POCTGLUCOSE 257* 223* 233*       Prealbumin: No results for input(s): "PREALBUMIN" in the last 48 hours.  Respiratory Culture: No results for input(s): "GSRESP", "RESPIRATORYC" in the last 48 hours.  Troponin:   No results for input(s): "TROPONINI", "TROPONINIHS" in the last 48 hours.    TSH: No results for input(s): "TSH" in the last 4320 hours.  Urine Culture: No results for input(s): "LABURIN" in the last 48 hours.    Urine Studies:   No results for input(s): " ""COLORU", "APPEARANCEUA", "PHUR", "SPECGRAV", "PROTEINUA", "GLUCUA", "KETONESU", "BILIRUBINUA", "OCCULTUA", "NITRITE", "UROBILINOGEN", "LEUKOCYTESUR", "RBCUA", "WBCUA", "BACTERIA", "SQUAMEPITHEL", "HYALINECASTS" in the last 48 hours.    Invalid input(s): "WRIGHTSUR"      Significant Imaging:    CXR  Left sided atelectasis  "

## 2023-10-08 NOTE — NURSING
Phoned [facility] to inquire about admission on weekend.  Instructed that because patient has never been there, she can't be transferred until tomorrow am.   Gave me fax number to fax updated labs.

## 2023-10-08 NOTE — PROGRESS NOTES
[facility]/Surg  [facility] Medicine  Progress Note    Patient Name: Lanie Abad  MRN: [id number]  Patient Class: IP- Inpatient   Admission Date: 10/1/2023  Length of Stay: 7 days  Attending Physician: Garima Lindo MD  Primary Care Provider: Kaycee Esparza FNP-C        Subjective:     Principal Problem:Sepsis        HPI:  89 yo female with hx IDDM, Colostomy, Rt mastectomy due to breast CA, Recent Left er infection lives alone gets around with wheelchair presents with a fall in the bathroom couldn't get up. She had another fall 4 days ago after her bathroom chair chair broke. Denies Chest pain, SOB, productive cough, N/V/D, has dysuria, and frequency. Accompanied by her children daughter and son      Overview/Hospital Course:  10/02/2023 pt admitted with frequent falls at home.  Lives with her son, she is a very poor historian, denies any pain complaints due to falls.    10/3/2023 urine culture gram neg rosa elena > 100,000 on iv rocephin  Sodium 124 today   Move to chair with PT today   Case mgmt spoke with family today interested in SNF placement for therapy  10/4/2023 sodium still low today   Urine culture E coli sens to rocephin  Working with PT   10/5/2023   Feel better today   Lab improving  Case work on SNF  Awaiting PT   10/6/2023 no new c/o   Feel better today   10/7/2023 lab improving  Awaiting PT  10/8/2023 no c/o awaiting placement       Past Medical History:   Diagnosis Date    Blood clotting disorder     Breast cancer     Closed comminuted intertrochanteric fracture of femur     Colostomy status     Diabetes mellitus, type 2     History of deep vein thrombophlebitis of lower extremity        Past Surgical History:   Procedure Laterality Date    BLADDER SUSPENSION      CHOLECYSTECTOMY      HYSTERECTOMY      INSERTION OF INTRAMEDULLARY ROSA ELENA Right 08/03/2018    femur    MASTECTOMY Right        Review of patient's allergies indicates:   Allergen Reactions    Pregabalin       Other reaction(s): unknown       No current facility-administered medications on file prior to encounter.     Current Outpatient Medications on File Prior to Encounter   Medication Sig    ciprofloxacin-dexAMETHasone 0.3-0.1% (CIPRODEX) 0.3-0.1 % DrpS Place 4 drops into both ears 2 (two) times daily.    TRESIBA FLEXTOUCH U-100 100 unit/mL (3 mL) insulin pen SMARTSI Unit(s) SUB-Q Every Evening    liraglutide 0.6 mg/0.1 mL, 18 mg/3 mL, subq PNIJ (VICTOZA 2-RADHA) 0.6 mg/0.1 mL (18 mg/3 mL) PnIj pen Inject 1.8 mg into the skin once daily.    [DISCONTINUED] OZEMPIC 0.25 mg or 0.5 mg(2 mg/1.5 mL) pen injector Inject 2 mg into the skin every 7 days.    [DISCONTINUED] OZEMPIC 1 mg/dose (4 mg/3 mL) Inject 1 mg into the skin every 7 days.    [DISCONTINUED] semaglutide (OZEMPIC) 0.25 mg or 0.5 mg(2 mg/1.5 mL) pen injector INJECT 1MG (2 SHOTS OF 0.5MG) SUBQ WEEKLY AS DIRECTED - ROTATE INJECTION SITES    [DISCONTINUED] semaglutide (OZEMPIC) 1 mg/dose (4 mg/3 mL) Inject 1 mg into the skin.     Family History    None       Tobacco Use    Smoking status: Never    Smokeless tobacco: Never   Substance and Sexual Activity    Alcohol use: Never    Drug use: Never    Sexual activity: Not Currently     Review of Systems   Constitutional: Negative.    HENT:  Positive for ear pain.    Eyes: Negative.    Respiratory: Negative.     Cardiovascular: Negative.    Gastrointestinal: Negative.    Endocrine: Negative.    Genitourinary:  Positive for dysuria and frequency.   Musculoskeletal: Negative.    Skin: Negative.    Allergic/Immunologic: Negative.    Neurological:  Positive for weakness.   Hematological: Negative.    Psychiatric/Behavioral: Negative.       Objective:     Vital Signs (Most Recent):  Temp: 97.3 °F (36.3 °C) (10/08/23 1125)  Pulse: 80 (10/08/23 1125)  Resp: 18 (10/08/23 1125)  BP: (!) 164/73 (10/08/23 112)  SpO2: 95 % (10/08/23 1125) Vital Signs (24h Range):  Temp:  [97.2 °F (36.2 °C)-98.3 °F (36.8 °C)] 97.3 °F  "(36.3 °C)  Pulse:  [71-91] 80  Resp:  [16-20] 18  SpO2:  [93 %-97 %] 95 %  BP: (126-164)/(67-93) 164/73     Weight: 75.6 kg (166 lb 10.7 oz)  Body mass index is 30.48 kg/m².     Physical Exam  Constitutional:       Appearance: Normal appearance. She is obese.   HENT:      Head: Normocephalic and atraumatic.      Mouth/Throat:      Pharynx: Oropharynx is clear.   Eyes:      Extraocular Movements: Extraocular movements intact.      Conjunctiva/sclera: Conjunctivae normal.      Pupils: Pupils are equal, round, and reactive to light.   Cardiovascular:      Rate and Rhythm: Regular rhythm. Tachycardia present.   Pulmonary:      Effort: Pulmonary effort is normal.      Breath sounds: Normal breath sounds.   Abdominal:      General: Abdomen is flat. Bowel sounds are normal.      Palpations: Abdomen is soft.      Comments: Colostomy in place   Musculoskeletal:         General: Normal range of motion.      Cervical back: Normal range of motion and neck supple.   Skin:     General: Skin is warm and dry.   Neurological:      General: No focal deficit present.      Mental Status: She is alert and oriented to person, place, and time. Mental status is at baseline.      Comments: Speech is difficult to understand with slurring, left side lower facial droop   Psychiatric:         Mood and Affect: Mood normal.              CRANIAL NERVES     CN III, IV, VI   Pupils are equal, round, and reactive to light.       Significant Labs: All pertinent labs within the past 24 hours have been reviewed.  A1C:   Recent Labs   Lab 09/11/23  1006   HGBA1C 8.8*       ABGs: No results for input(s): "PH", "PCO2", "HCO3", "POCSATURATED", "BE", "TOTALHB", "COHB", "METHB", "O2HB", "POCFIO2", "PO2" in the last 48 hours.  Bilirubin:   Recent Labs   Lab 10/01/23  1545 10/02/23  0428   BILITOT 1.4* 1.1*       Blood Culture: No results for input(s): "LABBLOO" in the last 48 hours.  BMP:   Recent Labs   Lab 10/08/23  0603   *   K 3.9   CO2 25   BUN 11.0 " "  CREATININE 0.43*   CALCIUM 8.9       CBC:   Recent Labs   Lab 10/07/23  0522 10/08/23  0603   WBC 9.92 9.47   HGB 13.1 13.8   HCT 37.6 39.9    268       CMP:   Recent Labs   Lab 10/07/23  0522 10/08/23  0603   * 130*   K 3.7 3.9   CO2 21 25   BUN 11.0 11.0   CREATININE 0.44* 0.43*   CALCIUM 8.5 8.9       Cardiac Markers: No results for input(s): "CKMB", "MYOGLOBIN", "BNP", "TROPISTAT" in the last 48 hours.  Coagulation: No results for input(s): "PT", "INR", "APTT" in the last 48 hours.  Lactic Acid: No results for input(s): "LACTATE" in the last 48 hours.  Lipase: No results for input(s): "LIPASE" in the last 48 hours.  Lipid Panel: No results for input(s): "CHOL", "HDL", "LDLCALC", "TRIG", "CHOLHDL" in the last 48 hours.  Magnesium: No results for input(s): "MG" in the last 48 hours.    Pathology Results  (Last 10 years)      None          POCT Glucose:   Recent Labs   Lab 10/07/23  2029 10/08/23  0756 10/08/23  1044   POCTGLUCOSE 257* 223* 233*       Prealbumin: No results for input(s): "PREALBUMIN" in the last 48 hours.  Respiratory Culture: No results for input(s): "GSRESP", "RESPIRATORYC" in the last 48 hours.  Troponin:   No results for input(s): "TROPONINI", "TROPONINIHS" in the last 48 hours.    TSH: No results for input(s): "TSH" in the last 4320 hours.  Urine Culture: No results for input(s): "LABURIN" in the last 48 hours.    Urine Studies:   No results for input(s): "COLORU", "APPEARANCEUA", "PHUR", "SPECGRAV", "PROTEINUA", "GLUCUA", "KETONESU", "BILIRUBINUA", "OCCULTUA", "NITRITE", "UROBILINOGEN", "LEUKOCYTESUR", "RBCUA", "WBCUA", "BACTERIA", "SQUAMEPITHEL", "HYALINECASTS" in the last 48 hours.    Invalid input(s): "WRIGHTSUR"      Significant Imaging:    CXR  Left sided atelectasis      Assessment/Plan:      * Sepsis  This patient does have evidence of infective focus  My overall impression is sepsis.  Source: Urinary Tract  Antibiotics given-   Antibiotics (72h ago, onward)    Start     " "Stop Route Frequency Ordered    10/01/23 2100  ciprofloxacin-hydrocortisone 0.2-1% otic suspension 3 drop         -- LEFT EAR 2 times daily 10/01/23 1712    10/01/23 1815  cefTRIAXone (ROCEPHIN) 1 g in dextrose 5 % in water (D5W) 100 mL IVPB (MB+)         -- IV Every 24 hours (non-standard times) 10/01/23 1712        Latest lactate reviewed-  No results for input(s): "LACTATE" in the last 72 hours.  Organ dysfunction indicated by Encephalopathy    Fluid challenge Actual Body weight- Patient will receive 30ml/kg actual body weight to calculate fluid bolus for treatment of septic shock.     Post- resuscitation assessment No - Post resuscitation assessment not needed       Will Not start Pressors- Levophed for MAP of 65  Source control achieved by: contiue iv abx    Hyponatremia  Patient has hyponatremia which is uncontrolled,We will aim to correct the sodium by 4-6mEq in 24 hours. We will monitor sodium Daily. The hyponatremia is due to Dehydration/hypovolemia. We will treat the hyponatremia with Sodium tablets. The patient's sodium results have been reviewed and are listed below.  Recent Labs   Lab 10/08/23  0603   *   add salt tab BID today repeat level in AM     Decubitus ulcer of coccyx, stage 2  POA  Consult wound care      Falls frequently  Will consult PT  Case management to discuss with family, may consider SNF level of care post d/c      UTI (urinary tract infection)  Continue iv abx  F/u on cx  Gram negative betsey > 100,000  Continue Iv rocephin   E coli    Uncontrolled diabetes mellitus with hyperglycemia, with long-term current use of insulin  accuchecks  Increase detemir to 20U      Blood sugar better today 215 this AM         VTE Risk Mitigation (From admission, onward)         Ordered     enoxaparin injection 40 mg  Every 24 hours         10/01/23 1740     IP VTE HIGH RISK PATIENT  Once         10/01/23 1712                Discharge Planning   MIKE: 10/11/2023     Code Status: Full Code   Is the " patient medically ready for discharge?:     Reason for patient still in hospital (select all that apply): Patient trending condition, Laboratory test and Treatment  Discharge Plan A: Skilled Nursing Facility   Discharge Delays: (!) Procedure Scheduling (IR, OR, Labs, Echo, Cath, Echo, EEG)              Mark Waite MD  Department of Hospital Medicine   [facility]/Surg

## 2023-10-08 NOTE — ASSESSMENT & PLAN NOTE
Patient has hyponatremia which is uncontrolled,We will aim to correct the sodium by 4-6mEq in 24 hours. We will monitor sodium Daily. The hyponatremia is due to Dehydration/hypovolemia. We will treat the hyponatremia with Sodium tablets. The patient's sodium results have been reviewed and are listed below.  Recent Labs   Lab 10/08/23  0603   *   add salt tab BID today repeat level in AM

## 2023-10-08 NOTE — PLAN OF CARE
Problem: Adult Inpatient Plan of Care  Goal: Plan of Care Review  10/8/2023 0619 by Hannah Montesinos LPN  Outcome: Ongoing, Progressing  10/8/2023 0619 by Hannah Montesinos LPN  Outcome: Ongoing, Progressing  Goal: Patient-Specific Goal (Individualized)  10/8/2023 0619 by Hannah Montesinos LPN  Outcome: Ongoing, Progressing  10/8/2023 0619 by Hannah Montesinos LPN  Outcome: Ongoing, Progressing  Goal: Absence of Hospital-Acquired Illness or Injury  10/8/2023 0619 by Hannah Montesinos LPN  Outcome: Ongoing, Progressing  10/8/2023 0619 by Hannah Montesinos LPN  Outcome: Ongoing, Progressing  Goal: Optimal Comfort and Wellbeing  10/8/2023 0619 by Hannah Montesinos LPN  Outcome: Ongoing, Progressing  10/8/2023 0619 by Hannah Montesinos LPN  Outcome: Ongoing, Progressing  Goal: Readiness for Transition of Care  10/8/2023 0619 by Hannah Montesinos LPN  Outcome: Ongoing, Progressing  10/8/2023 0619 by Hannah Montesinos LPN  Outcome: Ongoing, Progressing     Problem: Diabetes Comorbidity  Goal: Blood Glucose Level Within Targeted Range  10/8/2023 0619 by Hannah Montesinos LPN  Outcome: Ongoing, Progressing  10/8/2023 0619 by Hannah Montesinos LPN  Outcome: Ongoing, Progressing     Problem: Skin Injury Risk Increased  Goal: Skin Health and Integrity  10/8/2023 0619 by Hannah Montesinos LPN  Outcome: Ongoing, Progressing  10/8/2023 0619 by Hannah Montesinos LPN  Outcome: Ongoing, Progressing     Problem: Impaired Wound Healing  Goal: Optimal Wound Healing  10/8/2023 0619 by Hannah Montesinos LPN  Outcome: Ongoing, Progressing  10/8/2023 0619 by Hannah Montesinos LPN  Outcome: Ongoing, Progressing     Problem: Fall Injury Risk  Goal: Absence of Fall and Fall-Related Injury  10/8/2023 0619 by Hannah Montesinos LPN  Outcome: Ongoing, Progressing  10/8/2023 0619 by Hannah Montesinos LPN  Outcome: Ongoing, Progressing     Problem: Adjustment to Illness (Sepsis/Septic Shock)  Goal: Optimal Coping  10/8/2023 0619 by Yamel  KRUNAL Young  Outcome: Ongoing, Progressing  10/8/2023 0619 by Hannah Montesinos LPN  Outcome: Ongoing, Progressing     Problem: Bleeding (Sepsis/Septic Shock)  Goal: Absence of Bleeding  10/8/2023 0619 by Hannah Montesinos LPN  Outcome: Ongoing, Progressing  10/8/2023 0619 by Hannah Montesinos LPN  Outcome: Ongoing, Progressing     Problem: Glycemic Control Impaired (Sepsis/Septic Shock)  Goal: Blood Glucose Level Within Desired Range  10/8/2023 0619 by Hannah Montesinos LPN  Outcome: Ongoing, Progressing  10/8/2023 0619 by Hannah Montesinos LPN  Outcome: Ongoing, Progressing     Problem: Infection Progression (Sepsis/Septic Shock)  Goal: Absence of Infection Signs and Symptoms  10/8/2023 0619 by Hannah Montesinos LPN  Outcome: Ongoing, Progressing  10/8/2023 0619 by Hannah Montesinos LPN  Outcome: Ongoing, Progressing     Problem: Nutrition Impaired (Sepsis/Septic Shock)  Goal: Optimal Nutrition Intake  10/8/2023 0619 by Hannah Montesinos LPN  Outcome: Ongoing, Progressing  10/8/2023 0619 by Hannah Montesinos LPN  Outcome: Ongoing, Progressing     Problem: Electrolyte Imbalance  Goal: Electrolyte Balance  10/8/2023 0619 by Hannah Montesinos LPN  Outcome: Ongoing, Progressing  10/8/2023 0619 by Hannah Montesinos LPN  Outcome: Ongoing, Progressing

## 2023-10-09 VITALS
HEIGHT: 62 IN | OXYGEN SATURATION: 94 % | SYSTOLIC BLOOD PRESSURE: 120 MMHG | WEIGHT: 150.56 LBS | TEMPERATURE: 97 F | RESPIRATION RATE: 18 BRPM | BODY MASS INDEX: 27.7 KG/M2 | HEART RATE: 89 BPM | DIASTOLIC BLOOD PRESSURE: 63 MMHG

## 2023-10-09 LAB
ANION GAP SERPL CALC-SCNC: 9 MEQ/L (ref 2–13)
BASOPHILS # BLD AUTO: 0.03 X10(3)/MCL (ref 0.01–0.08)
BASOPHILS NFR BLD AUTO: 0.3 % (ref 0.1–1.2)
BUN SERPL-MCNC: 15 MG/DL (ref 7–20)
CALCIUM SERPL-MCNC: 8.8 MG/DL (ref 8.4–10.2)
CHLORIDE SERPL-SCNC: 98 MMOL/L (ref 98–110)
CO2 SERPL-SCNC: 22 MMOL/L (ref 21–32)
CREAT SERPL-MCNC: 0.43 MG/DL (ref 0.66–1.25)
CREAT/UREA NIT SERPL: 35 (ref 12–20)
EOSINOPHIL # BLD AUTO: 0.02 X10(3)/MCL (ref 0.04–0.36)
EOSINOPHIL NFR BLD AUTO: 0.2 % (ref 0.7–7)
ERYTHROCYTE [DISTWIDTH] IN BLOOD BY AUTOMATED COUNT: 13.5 % (ref 11–14.5)
GFR SERPLBLD CREATININE-BSD FMLA CKD-EPI: >90 MLS/MIN/1.73/M2
GLUCOSE SERPL-MCNC: 267 MG/DL (ref 70–115)
HCT VFR BLD AUTO: 37.8 % (ref 36–48)
HGB BLD-MCNC: 13.3 G/DL (ref 11.8–16)
IMM GRANULOCYTES # BLD AUTO: 0.11 X10(3)/MCL (ref 0–0.03)
IMM GRANULOCYTES NFR BLD AUTO: 1.2 % (ref 0–0.5)
LYMPHOCYTES # BLD AUTO: 1.36 X10(3)/MCL (ref 1.16–3.74)
LYMPHOCYTES NFR BLD AUTO: 14.2 % (ref 20–55)
MCH RBC QN AUTO: 31.2 PG (ref 27–34)
MCHC RBC AUTO-ENTMCNC: 35.2 G/DL (ref 31–37)
MCV RBC AUTO: 88.7 FL (ref 79–99)
MONOCYTES # BLD AUTO: 1.03 X10(3)/MCL (ref 0.24–0.36)
MONOCYTES NFR BLD AUTO: 10.8 % (ref 4.7–12.5)
NEUTROPHILS # BLD AUTO: 7.01 X10(3)/MCL (ref 1.56–6.13)
NEUTROPHILS NFR BLD AUTO: 73.3 % (ref 37–73)
NRBC BLD AUTO-RTO: 0 %
PLATELET # BLD AUTO: 322 X10(3)/MCL (ref 140–371)
PMV BLD AUTO: 8.7 FL (ref 9.4–12.4)
POCT GLUCOSE: 237 MG/DL (ref 70–110)
POCT GLUCOSE: 284 MG/DL (ref 70–110)
POTASSIUM SERPL-SCNC: 3.9 MMOL/L (ref 3.5–5.1)
RBC # BLD AUTO: 4.26 X10(6)/MCL (ref 4–5.1)
SARS-COV-2 RDRP RESP QL NAA+PROBE: NEGATIVE
SODIUM SERPL-SCNC: 129 MMOL/L (ref 135–145)
WBC # SPEC AUTO: 9.56 X10(3)/MCL (ref 4–11.5)

## 2023-10-09 PROCEDURE — 87635 SARS-COV-2 COVID-19 AMP PRB: CPT | Performed by: FAMILY MEDICINE

## 2023-10-09 PROCEDURE — 25000003 PHARM REV CODE 250: Performed by: FAMILY MEDICINE

## 2023-10-09 PROCEDURE — 80048 BASIC METABOLIC PNL TOTAL CA: CPT | Performed by: FAMILY MEDICINE

## 2023-10-09 PROCEDURE — 85025 COMPLETE CBC W/AUTO DIFF WBC: CPT | Performed by: FAMILY MEDICINE

## 2023-10-09 PROCEDURE — 36415 COLL VENOUS BLD VENIPUNCTURE: CPT | Performed by: FAMILY MEDICINE

## 2023-10-09 PROCEDURE — 94761 N-INVAS EAR/PLS OXIMETRY MLT: CPT

## 2023-10-09 RX ADMIN — INSULIN ASPART 2 UNITS: 100 INJECTION, SOLUTION INTRAVENOUS; SUBCUTANEOUS at 09:10

## 2023-10-09 RX ADMIN — CIPROFLOXACIN HYDROCHLORIDE AND HYDROCORTISONE 3 DROP: 2; 10 SUSPENSION/ DROPS AURICULAR (OTIC) at 09:10

## 2023-10-09 RX ADMIN — INSULIN ASPART 3 UNITS: 100 INJECTION, SOLUTION INTRAVENOUS; SUBCUTANEOUS at 11:10

## 2023-10-09 RX ADMIN — INSULIN DETEMIR 14 UNITS: 100 INJECTION, SOLUTION SUBCUTANEOUS at 09:10

## 2023-10-09 RX ADMIN — SODIUM CHLORIDE 1000 MG: 1 TABLET ORAL at 09:10

## 2023-10-09 RX ADMIN — CIPROFLOXACIN HYDROCHLORIDE 500 MG: 500 TABLET, FILM COATED ORAL at 09:10

## 2023-10-09 NOTE — PHYSICIAN QUERY
PT Name: Lanie Abad  MR #: [id number]     Documentation Clarification      CDS/: Price Dias RN, CCDS               Contact information:   [email or web address]       This form is a permanent document in the medical record.     Query Date: October 9, 2023    By submitting this query, we are merely seeking further clarification of documentation. Please utilize your independent clinical judgment when addressing the question(s) below.    The Medical Record reflects the following:    Clinical Findings Location in Medical Records     Mental Status: She is alert and oriented to person, place, and time.   Clinical Sepsis due to UTI check Ucx, Bcx, started on Rocephin  Pt is in [facility] ED, I am in [place], [place]. Nursing staff assisted with evaluation of the patient   10/1  H&P: Hosp. Med   ()   Principal Problem:Sepsis;   Mental Status: She is alert and oriented to person, place, and time. Mental status is at baseline; Speech is difficult to understand with slurring, left side lower facial droop   Sepsis: Organ dysfunction indicated by Encephalopathy    SIRS with infection but without Sepsis  is confirmed    10/2 -10/8               10/6 Physician Query response     10/1 sodium: 126;                  glucose: 378  10/2  sodium: 123,  124;         glucose:  284, 230    Unspecified Pneumonia-- is confirmed  Labs        10/6 Physician Query response       Due to the conflicting clinical picture, please clinically validate the diagnosis of :      Encephalopathy.    If validated, please provide additional clinical support for the diagnosis.       [  X ] Encephalopathy diagnosis is confirmed. Please specify clinical support (signs, symptoms, and treatment) for   the confirmed diagnosis: _suspect 2/2 infection E coli UTI____     [   ] Encephalopathy diagnosis is not confirmed and/or it has been ruled out     [   ] Encephalopathy diagnosis is not confirmed and/or it has been ruled out, other diagnosis ruled in  (please          specify):_______________     [   ] Other clarification (please specify): ___________________         Form No. [id number]

## 2023-10-09 NOTE — PLAN OF CARE
Problem: Adult Inpatient Plan of Care  Goal: Plan of Care Review  10/9/2023 1102 by Denise Dixon RN  Outcome: Met  10/9/2023 1033 by Denise Dixon RN  Outcome: Ongoing, Progressing  Goal: Patient-Specific Goal (Individualized)  10/9/2023 1102 by Denise Dixon RN  Outcome: Met  10/9/2023 1033 by Denise Dixon RN  Outcome: Ongoing, Progressing  Goal: Absence of Hospital-Acquired Illness or Injury  10/9/2023 1102 by Denise Dixon RN  Outcome: Met  10/9/2023 1033 by Denise Dixon RN  Outcome: Ongoing, Progressing  Goal: Optimal Comfort and Wellbeing  10/9/2023 1102 by Denise Dixon RN  Outcome: Met  10/9/2023 1033 by Denise Dixon RN  Outcome: Ongoing, Progressing  Goal: Readiness for Transition of Care  10/9/2023 1102 by Denise Dixon RN  Outcome: Met  10/9/2023 1033 by Denise Dixon RN  Outcome: Ongoing, Progressing     Problem: Diabetes Comorbidity  Goal: Blood Glucose Level Within Targeted Range  10/9/2023 1102 by Denise Dixon RN  Outcome: Met  10/9/2023 1033 by Denise Dixon RN  Outcome: Ongoing, Progressing     Problem: Skin Injury Risk Increased  Goal: Skin Health and Integrity  10/9/2023 1102 by Denise Dixon RN  Outcome: Met  10/9/2023 1033 by Denise Dixon RN  Outcome: Ongoing, Progressing     Problem: Impaired Wound Healing  Goal: Optimal Wound Healing  10/9/2023 1102 by Denise Dixon RN  Outcome: Met  10/9/2023 1033 by Denise Dixon RN  Outcome: Ongoing, Progressing     Problem: Fall Injury Risk  Goal: Absence of Fall and Fall-Related Injury  10/9/2023 1102 by Denise Dixon RN  Outcome: Met  10/9/2023 1033 by Denise Dixon RN  Outcome: Ongoing, Progressing     Problem: Adjustment to Illness (Sepsis/Septic Shock)  Goal: Optimal Coping  10/9/2023 1102 by Denise Dixon RN  Outcome: Met  10/9/2023 1033 by Denise Dixon RN  Outcome: Ongoing, Progressing     Problem: Bleeding (Sepsis/Septic Shock)  Goal: Absence of Bleeding  10/9/2023 1102 by Destiny,  ROCIO Walker  Outcome: Met  10/9/2023 1033 by Denise Dixon RN  Outcome: Ongoing, Progressing     Problem: Glycemic Control Impaired (Sepsis/Septic Shock)  Goal: Blood Glucose Level Within Desired Range  10/9/2023 1102 by Denise Dixon RN  Outcome: Met  10/9/2023 1033 by Denise Dixon RN  Outcome: Ongoing, Progressing     Problem: Infection Progression (Sepsis/Septic Shock)  Goal: Absence of Infection Signs and Symptoms  10/9/2023 1102 by Denise Dixon RN  Outcome: Met  10/9/2023 1033 by Denise Dixon RN  Outcome: Ongoing, Progressing     Problem: Nutrition Impaired (Sepsis/Septic Shock)  Goal: Optimal Nutrition Intake  10/9/2023 1102 by Denise Dixon RN  Outcome: Met  10/9/2023 1033 by Denise Dixon RN  Outcome: Ongoing, Progressing     Problem: Electrolyte Imbalance  Goal: Electrolyte Balance  10/9/2023 1102 by Denise Dixon RN  Outcome: Met  10/9/2023 1033 by Denise Dixon RN  Outcome: Ongoing, Progressing

## 2023-10-09 NOTE — PLAN OF CARE
10/09/23 1126   Final Note   Assessment Type Final Discharge Note   Anticipated Discharge Disposition SNF   What phone number can be called within the next 1-3 days to see how you are doing after discharge? [phone]   Post-Acute Status   Post-Acute Authorization Placement  ([facility] SNF)   Post-Acute Placement Status Set-up Complete/Auth obtained   Coverage MCR   Discharge Delays None known at this time

## 2023-10-09 NOTE — PLAN OF CARE
Rec'd call from [facility] @ [facility] stating patient is approved for admit, nursing and MD notified.

## 2023-10-09 NOTE — PLAN OF CARE
Physician ordered to discharge patient to [facility]. [facility] was notified per phone/fax of pt's discharge. Nursing advised to call report to Miranda Palma @ [phone].

## 2023-10-09 NOTE — DISCHARGE SUMMARY
"Hospital Medicine  Discharge Summary    Patient Name: Lanie Abad  MRN: [id number]  Admit Date: 10/1/2023  Discharge Date:  10/9/2023  Status: IP- Inpatient   Length of Stay: 8      PHYSICIANS   Admitting Physician: Benja Mi MD  Discharging Physician: Mark Waite MD.  Primary Care Physician: Kaycee Esparza, FNP-RODRIGO        DISCHARGE DIAGNOSES     Sepsis  This patient does have evidence of infective focus  My overall impression is sepsis.  Source: Urinary Tract  Antibiotics given-             Antibiotics (72h ago, onward)     Start     Stop Route Frequency Ordered     10/01/23 2100   ciprofloxacin-hydrocortisone 0.2-1% otic suspension 3 drop         -- LEFT EAR 2 times daily 10/01/23 1712     10/01/23 1815   cefTRIAXone (ROCEPHIN) 1 g in dextrose 5 % in water (D5W) 100 mL IVPB (MB+)         -- IV Every 24 hours (non-standard times) 10/01/23 1712          Latest lactate reviewed-  No results for input(s): "LACTATE" in the last 72 hours.  Organ dysfunction indicated by Encephalopathy     Fluid challenge Actual Body weight- Patient will receive 30ml/kg actual body weight to calculate fluid bolus for treatment of septic shock.      Post- resuscitation assessment No - Post resuscitation assessment not needed         Will Not start Pressors- Levophed for MAP of 65  Source control achieved by: contiue iv abx     Hyponatremia  Patient has hyponatremia which is uncontrolled,We will aim to correct the sodium by 4-6mEq in 24 hours. We will monitor sodium Daily. The hyponatremia is due to Dehydration/hypovolemia. We will treat the hyponatremia with Sodium tablets. The patient's sodium results have been reviewed and are listed below.      Recent Labs   Lab 10/08/23  0603   *   add salt tab BID today repeat level in AM      Decubitus ulcer of coccyx, stage 2  POA  Consult wound care        Falls frequently  Will consult PT  Case management to discuss with family, may consider SNF level of care post d/c      "   UTI (urinary tract infection)  Continue iv abx  F/u on cx  Gram negative betsey > 100,000  Continue Iv rocephin   E coli     Uncontrolled diabetes mellitus with hyperglycemia, with long-term current use of insulin  accuchecks  Increase detemir to 20U        Blood sugar better today 215 this AM             PROCEDURES         HOSPITAL COURSE      Principal Problem:Sepsis           HPI:  89 yo female with hx IDDM, Colostomy, Rt mastectomy due to breast CA, Recent Left er infection lives alone gets around with wheelchair presents with a fall in the bathroom couldn't get up. She had another fall 4 days ago after her bathroom chair chair broke. Denies Chest pain, SOB, productive cough, N/V/D, has dysuria, and frequency. Accompanied by her children daughter and son        Overview/Hospital Course:  10/02/2023 pt admitted with frequent falls at home.  Lives with her son, she is a very poor historian, denies any pain complaints due to falls.    10/3/2023 urine culture gram neg betsey > 100,000 on iv rocephin  Sodium 124 today   Move to chair with PT today   Case mgmt spoke with family today interested in SNF placement for therapy  10/4/2023 sodium still low today   Urine culture E coli sens to rocephin  Working with PT   10/5/2023   Feel better today   Lab improving  Case work on SNF  Awaiting PT   10/6/2023 no new c/o   Feel better today   10/7/2023 lab improving  Awaiting PT  10/8/2023 no c/o awaiting placement      Completed abx course     STATUS  Improved, Stable    DISPOSITION  Discharge to SNF- [facility]  Diabetic     ACTIVITY  As tolerated      FOLLOW-UP         DISCHARGE MEDICATION RECONCILIATION        Medication List        CONTINUE taking these medications      ciprofloxacin-dexAMETHasone 0.3-0.1% 0.3-0.1 % Drps  Commonly known as: CIPRODEX  Place 4 drops into both ears 2 (two) times daily.     liraglutide 0.6 mg/0.1 mL (18 mg/3 mL) subq PNIJ 0.6 mg/0.1 mL (18 mg/3 mL) Pnij pen  Commonly known as:  "VICTOZA 2-RADHA  Inject 1.8 mg into the skin once daily.     TRESIBA FLEXTOUCH U-100 100 unit/mL (3 mL) insulin pen  Generic drug: insulin degludec                PHYSICAL EXAM   VITALS: T 96.7 °F (35.9 °C)   /63   P 89   RR 18   O2 (!) 94 %    Physical Exam  Vitals reviewed.   HENT:      Head: Normocephalic.   Cardiovascular:      Rate and Rhythm: Normal rate.   Pulmonary:      Effort: Pulmonary effort is normal.   Neurological:      Mental Status: She is alert.              Discharge time: 33 minutes     Mark Waite MD  [facility]       DIAGNOSITCS   CBC:   Recent Labs   Lab 10/07/23  0522 10/08/23  0603 10/09/23  0450   WBC 9.92 9.47 9.56   HGB 13.1 13.8 13.3   HCT 37.6 39.9 37.8    268 322     COAG:  No results for input(s): "APTT", "INR", "PTT" in the last 168 hours.  CMP:   Recent Labs   Lab 10/04/23  0447 10/05/23  0415 10/06/23  0435 10/07/23  0522 10/08/23  0603 10/09/23  0451   CALCIUM 8.3* 8.7   < > 8.5 8.9 8.8   * 129*   < > 128* 130* 129*   K 3.8 4.0   < > 3.7 3.9 3.9   CO2 22 28   < > 21 25 22   BUN 16.0 15.0   < > 11.0 11.0 15.0   CREATININE 0.51* 0.56*   < > 0.44* 0.43* 0.43*   MG 1.90 2.10  --   --   --   --     < > = values in this interval not displayed.     Estimated Creatinine Clearance: 78.8 mL/min (A) (based on SCr of 0.43 mg/dL (L)).  CARDIAC ENZYMES: No results for input(s): "TROPONINI", "CPK", "CKMB" in the last 72 hours.     No results for input(s): "AMYLASE", "LIPASE" in the last 168 hours.      Recent Labs     10/07/23  2029 10/08/23  0756 10/08/23  1044 10/08/23  1700 10/08/23  2010 10/09/23  0722   POCTGLUCOSE 257* 223* 233* 251* 327* 237*        Microbiology Results (last 7 days)       Procedure Component Value Units Date/Time    Urine culture [[id number]]  (Abnormal)  (Susceptibility) Collected: 10/01/23 1643    Order Status: Completed Specimen: Urine, Catheterized Updated: 10/04/23 0639     Urine Culture >/= 100,000 colonies/ml Escherichia coli         "       X-Ray KUB    Result Date: 10/3/2023  EXAMINATION: One-view abdomen (2 images) CLINICAL HISTORY: Abdominal pain COMPARISON: 09/06/2022 FINDINGS: Supine images show no dilated bowel loops.  No abnormal calcifications or significant stool burden.     No acute findings Electronically signed by: Cornell Cabrera MD Date:    10/03/2023 Time:    07:07    X-Ray Chest PA And Lateral    Result Date: 10/2/2023  EXAMINATION: XR CHEST PA AND LATERAL CLINICAL HISTORY: sob; TECHNIQUE: PA and lateral views of the chest were performed. COMPARISON: 10/01/2023 FINDINGS: The lungs are clear, with normal appearance of pulmonary vasculature and no pleural effusion or pneumothorax. The cardiac silhouette is normal in size. The hilar and mediastinal contours are unremarkable. Bones are intact.     No acute abnormality. Electronically signed by: Darnell Rosa MD Date:    10/02/2023 Time:    07:41    X-Ray Chest AP Portable    Result Date: 10/1/2023  EXAMINATION: XR CHEST AP PORTABLE CLINICAL HISTORY: Dizziness and giddiness. COMPARISON: Chest x-ray 12/26/2018 FINDINGS: Frontal view of the chest was obtained.  The cardiac silhouette is within normal limits for size. The aorta is mildly tortuous and partially calcified.  There are hazy and reticular opacities in left lung base which may reflect atelectasis or early infiltrate.  No evidence of pneumothorax or pleural effusion is seen.  No acute displaced fracture or dislocation is present.     1. There are hazy and reticular opacities in left lung base which may reflect atelectasis or early infiltrate. Electronically signed by: Jose Dean MD Date:    10/01/2023 Time:    16:30    Mammo Digital Screening Left with Santos    Result Date: 9/20/2023  Result: Mammo Digital Screening Left with Santos History: Patient is 89 y.o. and is seen for a screening mammogram. Films Compared: Prior images (if available) were compared. Findings:  This procedure was performed using tomosynthesis.  Computer-aided detection was utilized in the interpretation of this examination. The left breast has scattered areas of fibroglandular density. There is no evidence of suspicious masses, microcalcifications or architectural distortion.      No mammographic evidence of malignancy. BI-RADS Category 2: benign Recommendation: Routine screening mammogram in 1 year, or as clinically indicated.     pt owns a cane

## 2023-10-09 NOTE — PT/OT/SLP DISCHARGE
Physical Therapy Discharge Summary    Name: Lanie Abad  MRN: [id number]   Principal Problem: Sepsis     Patient Discharged from acute Physical Therapy on 10/9/23.  Please refer to prior PT noted date on 10/7/23 for functional status.     Assessment:     Patient appropriate for care in another setting.    Objective:     GOALS:   Multidisciplinary Problems       Physical Therapy Goals          Problem: Physical Therapy    Goal Priority Disciplines Outcome Goal Variances Interventions   Physical Therapy Goal     PT, PT/OT Adequate for Care Transition     Description: Goals to be met by: discharge     Patient will increase functional independence with mobility by performin. Supine to sit with Contact Guard Assistance  2. Sit to stand transfer with Contact Guard Assistance  3. Bed to chair transfer with Contact Guard Assistance using Rolling Walker                         Reasons for Discontinuation of Therapy Services  Transfer to alternate level of care.      Plan:     Patient Discharged to: Skilled Nursing Facility.      10/9/2023

## 2023-10-12 ENCOUNTER — HOSPITAL ENCOUNTER (EMERGENCY)
Facility: HOSPITAL | Age: 88
Discharge: HOME | End: 2023-10-12
Attending: FAMILY MEDICINE
Payer: MEDICARE

## 2023-10-12 VITALS
WEIGHT: 149.94 LBS | HEIGHT: 64 IN | BODY MASS INDEX: 25.6 KG/M2 | TEMPERATURE: 98 F | HEART RATE: 75 BPM | OXYGEN SATURATION: 98 % | RESPIRATION RATE: 18 BRPM | DIASTOLIC BLOOD PRESSURE: 79 MMHG | SYSTOLIC BLOOD PRESSURE: 153 MMHG

## 2023-10-12 DIAGNOSIS — R53.83 FATIGUE, UNSPECIFIED TYPE: Primary | ICD-10-CM

## 2023-10-12 DIAGNOSIS — I63.9 STROKE: ICD-10-CM

## 2023-10-12 PROBLEM — G93.40 ACUTE ENCEPHALOPATHY: Status: ACTIVE | Noted: 2023-10-12

## 2023-10-12 LAB
ABO AND RH: NORMAL
ALBUMIN SERPL-MCNC: 3.3 G/DL (ref 3.4–5)
ALBUMIN/GLOB SERPL: 1.2 RATIO
ALP SERPL-CCNC: 160 UNIT/L (ref 50–144)
ALT SERPL-CCNC: 19 UNIT/L (ref 1–45)
ANION GAP SERPL CALC-SCNC: 6 MEQ/L (ref 2–13)
ANTIBODY SCREEN: NORMAL
APPEARANCE UR: CLEAR
AST SERPL-CCNC: 26 UNIT/L (ref 14–36)
BASOPHILS # BLD AUTO: 0.04 X10(3)/MCL (ref 0.01–0.08)
BASOPHILS NFR BLD AUTO: 0.4 % (ref 0.1–1.2)
BILIRUB SERPL-MCNC: 0.6 MG/DL (ref 0–1)
BILIRUB UR QL STRIP.AUTO: NEGATIVE
BNP BLD-MCNC: 194 PG/ML (ref 0–124.9)
BUN SERPL-MCNC: 7 MG/DL (ref 7–20)
CALCIUM SERPL-MCNC: 8.9 MG/DL (ref 8.4–10.2)
CHLORIDE SERPL-SCNC: 101 MMOL/L (ref 98–110)
CHOLEST SERPL-MCNC: 129 MG/DL (ref 0–200)
CO2 SERPL-SCNC: 22 MMOL/L (ref 21–32)
COLOR UR AUTO: YELLOW
CREAT SERPL-MCNC: 0.38 MG/DL (ref 0.66–1.25)
CREAT/UREA NIT SERPL: 18 (ref 12–20)
EOSINOPHIL # BLD AUTO: 0.03 X10(3)/MCL (ref 0.04–0.36)
EOSINOPHIL NFR BLD AUTO: 0.3 % (ref 0.7–7)
ERYTHROCYTE [DISTWIDTH] IN BLOOD BY AUTOMATED COUNT: 13.8 % (ref 11–14.5)
GFR SERPLBLD CREATININE-BSD FMLA CKD-EPI: >90 MLS/MIN/1.73/M2
GLOBULIN SER-MCNC: 2.8 GM/DL (ref 2–3.9)
GLUCOSE SERPL-MCNC: 264 MG/DL (ref 70–115)
GLUCOSE UR QL STRIP.AUTO: >=1000
HCT VFR BLD AUTO: 37.7 % (ref 36–48)
HDLC SERPL-MCNC: 45 MG/DL (ref 40–60)
HGB BLD-MCNC: 13.6 G/DL (ref 11.8–16)
IMM GRANULOCYTES # BLD AUTO: 0.11 X10(3)/MCL (ref 0–0.03)
IMM GRANULOCYTES NFR BLD AUTO: 1 % (ref 0–0.5)
INR PPP: 1.1
KETONES UR QL STRIP.AUTO: NEGATIVE
LDLC SERPL DIRECT ASSAY-SCNC: 57.4 MG/DL (ref 30–100)
LEUKOCYTE ESTERASE UR QL STRIP.AUTO: NEGATIVE
LYMPHOCYTES # BLD AUTO: 1.75 X10(3)/MCL (ref 1.16–3.74)
LYMPHOCYTES NFR BLD AUTO: 16.5 % (ref 20–55)
MCH RBC QN AUTO: 32.2 PG (ref 27–34)
MCHC RBC AUTO-ENTMCNC: 36.1 G/DL (ref 31–37)
MCV RBC AUTO: 89.3 FL (ref 79–99)
MONOCYTES # BLD AUTO: 1.03 X10(3)/MCL (ref 0.24–0.36)
MONOCYTES NFR BLD AUTO: 9.7 % (ref 4.7–12.5)
NEUTROPHILS # BLD AUTO: 7.67 X10(3)/MCL (ref 1.56–6.13)
NEUTROPHILS NFR BLD AUTO: 72.1 % (ref 37–73)
NITRITE UR QL STRIP.AUTO: NEGATIVE
NRBC BLD AUTO-RTO: 0 %
PH UR STRIP.AUTO: 6.5 [PH]
PLATELET # BLD AUTO: 382 X10(3)/MCL (ref 140–371)
PMV BLD AUTO: 8.7 FL (ref 9.4–12.4)
POCT GLUCOSE: 240 MG/DL (ref 70–110)
POTASSIUM SERPL-SCNC: 4.3 MMOL/L (ref 3.5–5.1)
PROT SERPL-MCNC: 6.1 GM/DL (ref 6.3–8.2)
PROT UR QL STRIP.AUTO: NEGATIVE
PROTHROMBIN TIME: 10.9 SECONDS (ref 9.3–11.9)
RBC # BLD AUTO: 4.22 X10(6)/MCL (ref 4–5.1)
RBC UR QL AUTO: NEGATIVE
SODIUM SERPL-SCNC: 129 MMOL/L (ref 135–145)
SP GR UR STRIP.AUTO: <=1.005 (ref 1–1.03)
SPECIMEN OUTDATE: NORMAL
TRIGL SERPL-MCNC: 83 MG/DL (ref 30–200)
TROPONIN I SERPL-MCNC: <0.012 NG/ML (ref 0–0.03)
TSH SERPL-ACNC: 0.82 UIU/ML (ref 0.36–3.74)
UROBILINOGEN UR STRIP-ACNC: 1
WBC # SPEC AUTO: 10.63 X10(3)/MCL (ref 4–11.5)

## 2023-10-12 PROCEDURE — 82962 GLUCOSE BLOOD TEST: CPT

## 2023-10-12 PROCEDURE — 36415 COLL VENOUS BLD VENIPUNCTURE: CPT | Performed by: FAMILY MEDICINE

## 2023-10-12 PROCEDURE — 84443 ASSAY THYROID STIM HORMONE: CPT | Performed by: FAMILY MEDICINE

## 2023-10-12 PROCEDURE — 86901 BLOOD TYPING SEROLOGIC RH(D): CPT | Performed by: FAMILY MEDICINE

## 2023-10-12 PROCEDURE — G0426 PR INPT TELEHEALTH CONSULT 50M: ICD-10-PCS | Mod: 95,,, | Performed by: PSYCHIATRY & NEUROLOGY

## 2023-10-12 PROCEDURE — 85610 PROTHROMBIN TIME: CPT | Performed by: FAMILY MEDICINE

## 2023-10-12 PROCEDURE — 80061 LIPID PANEL: CPT | Performed by: FAMILY MEDICINE

## 2023-10-12 PROCEDURE — 93005 ELECTROCARDIOGRAM TRACING: CPT

## 2023-10-12 PROCEDURE — G0426 INPT/ED TELECONSULT50: HCPCS | Mod: 95,,, | Performed by: PSYCHIATRY & NEUROLOGY

## 2023-10-12 PROCEDURE — 80053 COMPREHEN METABOLIC PANEL: CPT | Performed by: FAMILY MEDICINE

## 2023-10-12 PROCEDURE — 81003 URINALYSIS AUTO W/O SCOPE: CPT | Performed by: FAMILY MEDICINE

## 2023-10-12 PROCEDURE — 99285 EMERGENCY DEPT VISIT HI MDM: CPT | Mod: 25

## 2023-10-12 PROCEDURE — 93010 ELECTROCARDIOGRAM REPORT: CPT | Mod: ,,, | Performed by: INTERNAL MEDICINE

## 2023-10-12 PROCEDURE — 83880 ASSAY OF NATRIURETIC PEPTIDE: CPT | Performed by: FAMILY MEDICINE

## 2023-10-12 PROCEDURE — 85025 COMPLETE CBC W/AUTO DIFF WBC: CPT | Performed by: FAMILY MEDICINE

## 2023-10-12 PROCEDURE — 84484 ASSAY OF TROPONIN QUANT: CPT | Performed by: FAMILY MEDICINE

## 2023-10-12 PROCEDURE — 93010 EKG 12-LEAD: ICD-10-PCS | Mod: ,,, | Performed by: INTERNAL MEDICINE

## 2023-10-12 RX ORDER — ZINC SULFATE 50(220)MG
220 CAPSULE ORAL DAILY
COMMUNITY

## 2023-10-12 RX ORDER — INSULIN GLARGINE 100 [IU]/ML
14 INJECTION, SOLUTION SUBCUTANEOUS NIGHTLY
COMMUNITY
End: 2024-01-23

## 2023-10-12 RX ORDER — ASCORBIC ACID 500 MG
500 TABLET ORAL 2 TIMES DAILY
COMMUNITY
End: 2024-01-23

## 2023-10-12 RX ORDER — ERGOCALCIFEROL 1.25 MG/1
50000 CAPSULE ORAL
COMMUNITY
End: 2024-01-23 | Stop reason: ALTCHOICE

## 2023-10-12 NOTE — ED NOTES
"Received report from Pamela, nurse at [facility].  Nurse reports "Her face always droops but it is a little worse. She wasn't acting right this morning. She was also C/o SOB. Temp 97.1F, HR 81, /106, O2 94%."   "

## 2023-10-12 NOTE — HPI
89F w/ c/o abdominal pain, shoulder pain and headache starting yesterday, today she is more lethargic and can't feed herself.    Current Outpatient Medications   Medication Instructions    ascorbic acid (vitamin C) (VITAMIN C) 500 mg, Oral, 2 times daily    ciprofloxacin-dexAMETHasone 0.3-0.1% (CIPRODEX) 0.3-0.1 % DrpS 4 drops, Both Ears, 2 times daily    ergocalciferol (ERGOCALCIFEROL) 50,000 Units, Oral, Every 7 days    insulin glargine (LANTUS U-100 INSULIN) 14 Units, Subcutaneous, Nightly    insulin regular 100 unit/mL Inj injection Subcutaneous, 3 times daily before meals, Sliding scale    liraglutide 0.6 mg/0.1 mL (18 mg/3 mL) subq PNIJ (VICTOZA 2-RDAHA) 1.8 mg, Subcutaneous, Daily    TRESIBA FLEXTOUCH U-100 100 unit/mL (3 mL) insulin pen SMARTSI Unit(s) SUB-Q Every Evening    zinc sulfate (ZINCATE) 220 mg, Oral, Daily     Past Medical History:   Diagnosis Date    Blood clotting disorder     Breast cancer     Closed comminuted intertrochanteric fracture of femur     Colostomy status     Diabetes mellitus, type 2     History of deep vein thrombophlebitis of lower extremity

## 2023-10-12 NOTE — ED PROVIDER NOTES
"Encounter Date: 10/12/2023       History     Chief Complaint   Patient presents with    Facial Droop     Pt in per EMS from [facility] with c/o "more facial droop than normal".  Facial droop noted to left side.  Pt's complaint is SOB and left sided face and neck pain from diagnosed ear infection 2 days pta.       Patient presents for evaluation of facial droop. Patient has a history of Facial Droop and NH staff thinks patient's facial droop appears different today. Patient is verbal and responds appropriately to questions. Patient denies having any other associated symptoms at present.         Review of patient's allergies indicates:   Allergen Reactions    Pregabalin      Other reaction(s): unknown     Past Medical History:   Diagnosis Date    Blood clotting disorder     Breast cancer     Closed comminuted intertrochanteric fracture of femur     Colostomy status     Diabetes mellitus, type 2     History of deep vein thrombophlebitis of lower extremity      Past Surgical History:   Procedure Laterality Date    BLADDER SUSPENSION      CHOLECYSTECTOMY      HYSTERECTOMY      INSERTION OF INTRAMEDULLARY ROSA ELENA Right 08/03/2018    femur    MASTECTOMY Right      No family history on file.  Social History     Tobacco Use    Smoking status: Never    Smokeless tobacco: Never   Substance Use Topics    Alcohol use: Never    Drug use: Never     Review of Systems   Constitutional: Negative.    HENT: Negative.     Eyes: Negative.    Respiratory: Negative.     Cardiovascular: Negative.    Gastrointestinal: Negative.    Endocrine: Negative.    Musculoskeletal: Negative.    Allergic/Immunologic: Negative.    Neurological: Negative.    Hematological: Negative.    Psychiatric/Behavioral: Negative.         Physical Exam     Initial Vitals   BP Pulse Resp Temp SpO2   10/12/23 0804 10/12/23 0804 10/12/23 0804 10/12/23 0807 10/12/23 0804   (!) 144/74 84 17 98.8 °F (37.1 °C) (!) 94 %      MAP       --                Physical " Exam    Constitutional: She appears well-developed and well-nourished.   HENT:   Head: Normocephalic and atraumatic.   Eyes: EOM are normal. Pupils are equal, round, and reactive to light.   Neck: Neck supple.   Normal range of motion.  Cardiovascular:  Normal rate.           Pulmonary/Chest: Breath sounds normal.   Abdominal: Abdomen is soft.   Musculoskeletal:         General: Normal range of motion.      Cervical back: Normal range of motion and neck supple.     Neurological: She is alert. She has normal reflexes.   Skin: Skin is warm.   Psychiatric: She has a normal mood and affect.         ED Course   Procedures  Labs Reviewed   COMPREHENSIVE METABOLIC PANEL - Abnormal; Notable for the following components:       Result Value    Sodium Level 129 (*)     Glucose Level 264 (*)     Creatinine 0.38 (*)     Protein Total 6.1 (*)     Albumin Level 3.3 (*)     Alkaline Phosphatase 160 (*)     All other components within normal limits   NT-PRO NATRIURETIC PEPTIDE - Abnormal; Notable for the following components:    ProBNP 194.0 (*)     All other components within normal limits   CBC WITH DIFFERENTIAL - Abnormal; Notable for the following components:    Platelet 382 (*)     MPV 8.7 (*)     Lymph % 16.5 (*)     Eos % 0.3 (*)     Neut # 7.67 (*)     Mono # 1.03 (*)     Eos # 0.03 (*)     IG# 0.11 (*)     IG% 1.0 (*)     All other components within normal limits   URINALYSIS - Abnormal; Notable for the following components:    Glucose, UA >=1000 (*)     All other components within normal limits    Narrative:      URINE STABILITY IS 2 HOURS AT ROOM TEMP OR    SIX HOURS REFRIGERATED. PERFORMING TESTING ON    SPECIMENS GREATER THAN THIS AGE MAY AFFECT THE    FOLLOWING TESTS:    PH          SPECIFIC GRAVITY           BLOOD    CLARITY     BILIRUBIN               UROBILINOGEN   PROTIME-INR - Normal    Narrative:     Protimes are used to monitor anticoagulant agents such as warfarin. PT INR values are based on the current patient  normal mean and the MIGDALIA value for the specific instrument reagent used.  **Routine theraputic target values for the INR are 2.0-3.0**   TSH - Normal   LIPID PANEL - Normal   TROPONIN I - Normal   CBC W/ AUTO DIFFERENTIAL    Narrative:     The following orders were created for panel order CBC W/ AUTO DIFFERENTIAL.  Procedure                               Abnormality         Status                     ---------                               -----------         ------                     CBC with Differential[[id number]]       Abnormal            Final result                 Please view results for these tests on the individual orders.   POCT GLUCOSE, HAND-HELD DEVICE   TYPE & SCREEN     EKG Readings: (Independently Interpreted)   Initial Reading: No STEMI.   NSR with LBBB. Nonspecific ST-T Changes       Imaging Results              X-Ray Chest AP Portable (Final result)  Result time 10/12/23 08:21:58      Final result by Omar Chance MD (10/12/23 08:21:58)                   Impression:      No acute cardiopulmonary abnormality.      Electronically signed by: Omar Chance MD  Date:    10/12/2023  Time:    08:21               Narrative:    EXAMINATION:  Single view chest radiograph.    CLINICAL HISTORY:  Stroke;    TECHNIQUE:  Single view of the chest.    COMPARISON:  Chest radiograph 10/02/2023.    FINDINGS:  The lungs are clear without consolidation or effusion.  There is no pneumothorax.  The cardiac silhouette is normal in size.  There is no acute osseous abnormality.                                       CT Head Without Contrast (Final result)  Result time 10/12/23 08:09:12      Final result by Omar Chance MD (10/12/23 08:09:12)                   Impression:      Sequela of chronic small vessel ischemic disease without acute intracranial abnormality.      Electronically signed by: Omar Chance MD  Date:    10/12/2023  Time:    08:09               Narrative:    EXAMINATION:  CT HEAD WITHOUT CONTRAST    CLINICAL  HISTORY:  Stroke suspected (Ped 0-18y);    TECHNIQUE:  Axial images of the head were obtained without IV contrast administration.  Coronal and sagittal reconstructions were provided.  Three dimensional and MIP images were obtained and evaluated.  Total DLP was 1183.1 mGy-cm. Dose lowering technique and automated exposure control were utilized for this exam.    COMPARISON:  CT of the head 12/28/2018.    FINDINGS:  There is normal brain formation.  There is normal gray-white matter differentiation.  There is no hemorrhage, hydrocephalus, or midline shift.  There is periventricular and subcortical white matter hypodensity.  There is no intra or extra-axial fluid collection.  There is no cytotoxic or vasogenic edema.  There is no herniation.    The calvarium is intact.  There is no fracture.  The bilateral orbits are normal.  The paranasal sinuses and mastoid air cells are normally developed and free of disease.                                       Medications - No data to display  Medical Decision Making  Amount and/or Complexity of Data Reviewed  Labs: ordered.  Radiology: ordered.                               Clinical Impression:   Final diagnoses:  [I63.9] Stroke  [R53.83] Fatigue, unspecified type (Primary)        ED Disposition Condition    Discharge Stable          ED Prescriptions    None       Follow-up Information    None          Timmy Monroe MD  10/12/23 [id number]

## 2023-10-12 NOTE — ASSESSMENT & PLAN NOTE
Patient currently nonfocal. Out of window for lytic therapy. No signs of LVO.  Recommend w/u for lethargy and subacute decline in function. No clear acute focal neurovascular syndrome present at this time.  Imaging personally interpreted:  CT Brain: no evidence of early or subacute ischemic changes, intracerebral hemorrhage or hyperdense vessel signs  CTA Head & Neck: Not performed at time of consultation

## 2023-10-12 NOTE — CONSULTS
[facility]  Vascular Neurology  Comprehensive Stroke Center  TeleVascular Neurology Acute Consultation Note      Consult to Telemedicine - Acute Stroke  Consult performed by: Megan Rasheed MD  Consult ordered by: Megan Wang MD          Consulting Provider: MEGAN WANG.  Current Providers  No providers found    Patient Location:  [facility] EMERGENCY DEPARTMENT Emergency Department  Spoke hospital nurse at bedside with patient assisting consultant.     Patient information was obtained from patient and nursing home.         Assessment/Plan:       Diagnoses:   Neuro  Acute encephalopathy  Patient currently nonfocal. Out of window for lytic therapy. No signs of LVO.  Recommend w/u for lethargy and subacute decline in function. No clear acute focal neurovascular syndrome present at this time.  Imaging personally interpreted:  CT Brain: no evidence of early or subacute ischemic changes, intracerebral hemorrhage or hyperdense vessel signs  CTA Head & Neck: Not performed at time of consultation          STROKE DOCUMENTATION     Acute Stroke Times:   Acute Stroke Times   Last Known Normal Date: 10/11/23  Last Known Normal Time: 1800  Stroke Team Called Time: 0804  Stroke Team Arrival Time: 0812  CT Interpretation Time: 0814  Thrombolytic Therapy Recommended: No    NIH Scale:  1a. Level of Consciousness: 0-->Alert, keenly responsive  1b. LOC Questions: 0-->Answers both questions correctly  1c. LOC Commands: 0-->Performs both tasks correctly  2. Best Gaze: 0-->Normal  3. Visual: 0-->No visual loss  4. Facial Palsy: 1-->Minor paralysis (flattened nasolabial fold, asymmetry on smiling)  5a. Motor Arm, Left: 0-->No drift, limb holds 90 (or 45) degrees for full 10 secs  5b. Motor Arm, Right: 0-->No drift, limb holds 90 (or 45) degrees for full 10 secs  6a. Motor Leg, Left: 3-->No effort against gravity, leg falls to bed immediately  6b. Motor Leg, Right: 3-->No effort against gravity,  "leg falls to bed immediately  7. Limb Ataxia: 0-->Absent  8. Sensory: 0-->Normal, no sensory loss  9. Best Language: 0-->No aphasia, normal  10. Dysarthria: 1-->Mild-to-moderate dysarthria, patient slurs at least some words and, at worst, can be understood with some difficulty  11. Extinction and Inattention (formerly Neglect): 0-->No abnormality  Total (NIH Stroke Scale): 8     Modified [place]    [place] Coma Scale:    ABCD2 Score:    [id number] Score:   HAS -BLED Score:   ICH Score:   Hunt & Marie Classification:       Blood pressure (!) 144/74, pulse 84, temperature 98.8 °F (37.1 °C), temperature source Oral, resp. rate 17, height 5' 4" (1.626 m), weight 68 kg (149 lb 14.6 oz), SpO2 (!) 94 %.  Eligible for thrombolytic therapy?: No  Thrombolytic therapy recomended: Thrombolytic therapy not recommended due to Outside of treatment window   Possible Interventional Revascularization Candidate? No; at this time symptoms not suggestive of large vessel occlusion    Disposition Recommendation: pending further studies    Subjective:     History of Present Illness:  89F w/ c/o abdominal pain, shoulder pain and headache starting yesterday, today she is more lethargic and can't feed herself.    Current Outpatient Medications   Medication Instructions    ascorbic acid (vitamin C) (VITAMIN C) 500 mg, Oral, 2 times daily    ciprofloxacin-dexAMETHasone 0.3-0.1% (CIPRODEX) 0.3-0.1 % DrpS 4 drops, Both Ears, 2 times daily    ergocalciferol (ERGOCALCIFEROL) 50,000 Units, Oral, Every 7 days    insulin glargine (LANTUS U-100 INSULIN) 14 Units, Subcutaneous, Nightly    insulin regular 100 unit/mL Inj injection Subcutaneous, 3 times daily before meals, Sliding scale    liraglutide 0.6 mg/0.1 mL (18 mg/3 mL) subq PNIJ (VICTOZA 2-RADHA) 1.8 mg, Subcutaneous, Daily    TRESIBA FLEXTOUCH U-100 100 unit/mL (3 mL) insulin pen SMARTSI Unit(s) SUB-Q Every Evening    zinc sulfate (ZINCATE) 220 mg, Oral, Daily     Past Medical History: "   Diagnosis Date    Blood clotting disorder     Breast cancer     Closed comminuted intertrochanteric fracture of femur     Colostomy status     Diabetes mellitus, type 2     History of deep vein thrombophlebitis of lower extremity          No new subjective & objective note has been filed under this hospital service since the last note was generated.      Recommended the emergency room physician to have a brief discussion with the patient and/or family if available regarding the  risks and benefits of treatment, and to briefly document the occurrence of that discussion in his clinical encounter note.     The attending portion of this evaluation, treatment, and documentation was performed per Timmy Rasheed MD via audiovisual.    Billing code:  (non-intervention mild to moderate stroke, TIA, some mimics)      This patient has a critical neurological condition/illness, with some potential for high morbidity and mortality.  There is a moderate probability for acute neurological change leading to clinical and possibly life-threatening deterioration requiring highest level of physician preparedness for urgent intervention.  Care was coordinated with other physicians involved in the patient's care.  Radiologic studies and laboratory data were reviewed and interpreted, and plan of care was re-assessed based on the results.  Diagnosis, treatment options and prognosis may have been discussed with the patient and/or family members or caregiver.    In your opinion, this was a: Tier 2 Van Negative    Consult End Time: 8:19 AM     Timmy Rasheed MD  [facility]roke [facility]  Vascular Neurology   [facility]

## 2023-10-12 NOTE — ED NOTES
Pt arrived to ER via EMS. Pt presents with minimal left side facial droop. Pt c/o headache. See stroke narrator. Pt capillary refill <3. Pt acyanotic. No acute distress noted.

## 2024-01-01 PROBLEM — A41.9 SEPSIS: Status: RESOLVED | Noted: 2023-10-02 | Resolved: 2024-01-01

## 2024-01-01 PROBLEM — N39.0 UTI (URINARY TRACT INFECTION): Status: RESOLVED | Noted: 2023-10-02 | Resolved: 2024-01-01

## 2024-01-22 ENCOUNTER — TELEPHONE (OUTPATIENT)
Dept: FAMILY MEDICINE | Facility: CLINIC | Age: 89
End: 2024-01-22

## 2024-01-22 DIAGNOSIS — E11.9 TYPE 2 DIABETES MELLITUS WITHOUT COMPLICATION, WITH LONG-TERM CURRENT USE OF INSULIN: ICD-10-CM

## 2024-01-22 DIAGNOSIS — Z79.4 TYPE 2 DIABETES MELLITUS WITHOUT COMPLICATION, WITH LONG-TERM CURRENT USE OF INSULIN: ICD-10-CM

## 2024-01-23 RX ORDER — INSULIN GLARGINE 100 [IU]/ML
20 INJECTION, SOLUTION SUBCUTANEOUS NIGHTLY
Qty: 6 ML | Refills: 1 | Status: SHIPPED | OUTPATIENT
Start: 2024-01-23 | End: 2024-01-25 | Stop reason: ALTCHOICE

## 2024-01-23 NOTE — TELEPHONE ENCOUNTER
I spoke with Darling. She said that her blood sugar has been 208, 214, 210, 178, 269, 202, 136, 148, 112, 135, 189, and 211. She is currently taking Victoza 1.8mg, Lantus 20u, Lexapro 5mg, Meloxicam 7.5. She has [facility]. A nurse goes once a week, PT twice a week. She is unsure when her last labs were. She is trying to hire someone to help and once she does, she will be able to get her here.

## 2024-01-23 NOTE — TELEPHONE ENCOUNTER
She no showed her appointment yesterday. Darling called this morning asking why her refills were not sent in. I explained to her that Kaycee would have needed to see her due to her last being in the office in September and she was in the nursing home. She said that she has no way to get her here, she can't even get her out of the bed. She asked for Lantus 20u and Victoza 1.8. I told her that I would speak with Kaycee and call her back. Kaycee asked for daily CBG results and all of the medications that she is taking.

## 2024-01-24 ENCOUNTER — TELEPHONE (OUTPATIENT)
Dept: FAMILY MEDICINE | Facility: CLINIC | Age: 89
End: 2024-01-24
Payer: MEDICARE

## 2024-01-24 DIAGNOSIS — Z79.4 TYPE 2 DIABETES MELLITUS WITHOUT COMPLICATION, WITH LONG-TERM CURRENT USE OF INSULIN: Primary | ICD-10-CM

## 2024-01-24 DIAGNOSIS — E11.9 TYPE 2 DIABETES MELLITUS WITHOUT COMPLICATION, WITH LONG-TERM CURRENT USE OF INSULIN: Primary | ICD-10-CM

## 2024-01-25 RX ORDER — INSULIN GLARGINE 100 [IU]/ML
20 INJECTION, SOLUTION SUBCUTANEOUS NIGHTLY
Qty: 6 ML | Refills: 11 | Status: SHIPPED | OUTPATIENT
Start: 2024-01-25 | End: 2025-01-24

## 2024-01-25 NOTE — TELEPHONE ENCOUNTER
I sent for the PA on Victoza this morning. Yashira jules [employer] called. She said her insurance prefers Basaglar instead of Lantus. Please advise